# Patient Record
Sex: FEMALE | Race: WHITE | NOT HISPANIC OR LATINO | Employment: FULL TIME | URBAN - METROPOLITAN AREA
[De-identification: names, ages, dates, MRNs, and addresses within clinical notes are randomized per-mention and may not be internally consistent; named-entity substitution may affect disease eponyms.]

---

## 2023-01-12 DIAGNOSIS — M06.9 RHEUMATOID ARTHRITIS: Primary | ICD-10-CM

## 2023-05-15 ENCOUNTER — HOSPITAL ENCOUNTER (OUTPATIENT)
Dept: RADIOLOGY | Facility: HOSPITAL | Age: 58
Discharge: HOME | End: 2023-05-15
Attending: INTERNAL MEDICINE
Payer: COMMERCIAL

## 2023-05-15 ENCOUNTER — OFFICE VISIT (OUTPATIENT)
Dept: RHEUMATOLOGY | Facility: CLINIC | Age: 58
End: 2023-05-15
Payer: COMMERCIAL

## 2023-05-15 VITALS
OXYGEN SATURATION: 98 % | HEART RATE: 73 BPM | HEIGHT: 64 IN | BODY MASS INDEX: 23.05 KG/M2 | TEMPERATURE: 99 F | SYSTOLIC BLOOD PRESSURE: 124 MMHG | WEIGHT: 135 LBS | DIASTOLIC BLOOD PRESSURE: 70 MMHG

## 2023-05-15 DIAGNOSIS — M79.7 FIBROMYALGIA SYNDROME: ICD-10-CM

## 2023-05-15 DIAGNOSIS — L40.50 PSORIATIC ARTHRITIS: ICD-10-CM

## 2023-05-15 DIAGNOSIS — G47.00 INSOMNIA, UNSPECIFIED TYPE: ICD-10-CM

## 2023-05-15 DIAGNOSIS — L60.9 RIDGED NAILS: ICD-10-CM

## 2023-05-15 DIAGNOSIS — L40.50 PSORIATIC ARTHRITIS: Primary | ICD-10-CM

## 2023-05-15 PROBLEM — M06.9 RHEUMATOID ARTHRITIS: Status: ACTIVE | Noted: 2023-05-15

## 2023-05-15 PROCEDURE — 1159F PR MEDICATION LIST DOCUMENTED IN MEDICAL RECORD: ICD-10-PCS | Mod: CPTII,S$GLB,, | Performed by: INTERNAL MEDICINE

## 2023-05-15 PROCEDURE — 3008F BODY MASS INDEX DOCD: CPT | Mod: CPTII,S$GLB,, | Performed by: INTERNAL MEDICINE

## 2023-05-15 PROCEDURE — 3078F PR MOST RECENT DIASTOLIC BLOOD PRESSURE < 80 MM HG: ICD-10-PCS | Mod: CPTII,S$GLB,, | Performed by: INTERNAL MEDICINE

## 2023-05-15 PROCEDURE — 99999 PR PBB SHADOW E&M-EST. PATIENT-LVL IV: ICD-10-PCS | Mod: PBBFAC,,, | Performed by: INTERNAL MEDICINE

## 2023-05-15 PROCEDURE — 3008F PR BODY MASS INDEX (BMI) DOCUMENTED: ICD-10-PCS | Mod: CPTII,S$GLB,, | Performed by: INTERNAL MEDICINE

## 2023-05-15 PROCEDURE — 3078F DIAST BP <80 MM HG: CPT | Mod: CPTII,S$GLB,, | Performed by: INTERNAL MEDICINE

## 2023-05-15 PROCEDURE — 1159F MED LIST DOCD IN RCRD: CPT | Mod: CPTII,S$GLB,, | Performed by: INTERNAL MEDICINE

## 2023-05-15 PROCEDURE — 99204 PR OFFICE/OUTPT VISIT, NEW, LEVL IV, 45-59 MIN: ICD-10-PCS | Mod: S$GLB,,, | Performed by: INTERNAL MEDICINE

## 2023-05-15 PROCEDURE — 99999 PR PBB SHADOW E&M-EST. PATIENT-LVL IV: CPT | Mod: PBBFAC,,, | Performed by: INTERNAL MEDICINE

## 2023-05-15 PROCEDURE — 73130 X-RAY EXAM OF HAND: CPT | Mod: TC,50

## 2023-05-15 PROCEDURE — 99204 OFFICE O/P NEW MOD 45 MIN: CPT | Mod: S$GLB,,, | Performed by: INTERNAL MEDICINE

## 2023-05-15 PROCEDURE — 3074F SYST BP LT 130 MM HG: CPT | Mod: CPTII,S$GLB,, | Performed by: INTERNAL MEDICINE

## 2023-05-15 PROCEDURE — 3074F PR MOST RECENT SYSTOLIC BLOOD PRESSURE < 130 MM HG: ICD-10-PCS | Mod: CPTII,S$GLB,, | Performed by: INTERNAL MEDICINE

## 2023-05-15 RX ORDER — TIZANIDINE 2 MG/1
2 TABLET ORAL NIGHTLY
Qty: 90 TABLET | Refills: 3 | Status: SHIPPED | OUTPATIENT
Start: 2023-05-15 | End: 2023-05-26 | Stop reason: SDUPTHER

## 2023-05-15 RX ORDER — ATORVASTATIN CALCIUM 80 MG/1
80 TABLET, FILM COATED ORAL DAILY
COMMUNITY
End: 2023-08-30

## 2023-05-15 RX ORDER — FOLIC ACID 1 MG/1
1 TABLET ORAL DAILY
Qty: 30 TABLET | Refills: 5 | Status: SHIPPED | OUTPATIENT
Start: 2023-05-15 | End: 2023-08-30 | Stop reason: SDUPTHER

## 2023-05-15 RX ORDER — FLUOXETINE HYDROCHLORIDE 20 MG/1
20 CAPSULE ORAL DAILY
COMMUNITY

## 2023-05-15 RX ORDER — HYDROXYCHLOROQUINE SULFATE 200 MG/1
200 TABLET, FILM COATED ORAL 2 TIMES DAILY
COMMUNITY
End: 2023-05-15

## 2023-05-15 RX ORDER — METHOTREXATE 2.5 MG/1
10 TABLET ORAL
Qty: 60 TABLET | Refills: 3 | Status: SHIPPED | OUTPATIENT
Start: 2023-05-15 | End: 2023-05-26 | Stop reason: SDUPTHER

## 2023-05-15 NOTE — PROGRESS NOTES
"Subjective:       Patient ID: Lakshmi Zuñiga is a 58 y.o. female.    Chief Complaint: Referral (Referral for RA. Patient has been treated for RA before but is having a lot of flare ups lately. Patient complains of right thumb and right foot pain at this time. Pain 3/10)    Pt  is complaining of joint pain involving his MCP PIP wrist elbow shoulders hips knees and ankles bilaterally.  Is 7/10 in intensity dull in quality and continuous.  It is associated with a morning stiffness lasting for more than 60 minutes. Pt reports having difficulty maintaining a good night of sleep and this has been associated with myalgia of 7/10 in intensity.  This pain is dull continuous and gets worse mainly at night.  It is associated with fatigue.  No fever no chills no others.  The patient was diagnosed with rheumatoid arthritis by Dr. Stiles.  The patient has a history psoriasis, and now she has ridging of the nails and sausage fingers and toes.  This is diagnostic of psoriatic arthritis not rheumatoid.  I will switch her hydroxychloroquine to methotrexate and folic acid.      Review of Systems   Constitutional:  Negative for appetite change, chills and fever.   HENT:  Negative for congestion, ear pain, mouth sores, nosebleeds and trouble swallowing.    Eyes:  Negative for photophobia and discharge.   Respiratory:  Negative for chest tightness and shortness of breath.    Cardiovascular:  Negative for chest pain.   Gastrointestinal:  Negative for abdominal pain and vomiting.   Endocrine: Negative.    Genitourinary:  Negative for hematuria.   Musculoskeletal:         As per HPI   Skin:  Negative for rash.        idging of the nails   Neurological:  Negative for weakness.       Objective:   /70 (BP Location: Right arm, Patient Position: Sitting, BP Method: Medium (Automatic))   Pulse 73   Temp 98.6 °F (37 °C) (Oral)   Ht 5' 4" (1.626 m)   Wt 61.2 kg (135 lb)   SpO2 98%   BMI 23.17 kg/m²      Physical Exam   Constitutional: " She is oriented to person, place, and time. She appears well-developed and well-nourished. No distress.   HENT:   Head: Normocephalic and atraumatic.   Right Ear: External ear normal.   Left Ear: External ear normal.   Eyes: Pupils are equal, round, and reactive to light.   Cardiovascular: Normal rate, regular rhythm and normal heart sounds.   Pulmonary/Chest: Breath sounds normal.   Abdominal: Soft. There is no abdominal tenderness.   Musculoskeletal:      Right shoulder: Tenderness present.      Left shoulder: Tenderness present.      Right elbow: Tenderness present.      Left elbow: Tenderness present.      Right wrist: Tenderness present.      Left wrist: Tenderness present.      Cervical back: Neck supple.      Right hip: Tenderness present.      Left hip: Tenderness present.      Right knee: Tenderness present.      Left knee: Tenderness present.      Right ankle: Tenderness present.      Left ankle: Tenderness present.   Lymphadenopathy:     She has no cervical adenopathy.   Neurological: She is alert and oriented to person, place, and time. She displays normal reflexes. No cranial nerve deficit or sensory deficit. She exhibits normal muscle tone. Coordination normal.   Skin: No rash noted. No erythema.   idging of the nails   Vitals reviewed.      Right Side Rheumatological Exam     The patient is tender to palpation of the shoulder, elbow, wrist, knee, 1st PIP, 1st MCP, 2nd PIP, 2nd MCP, 3rd PIP, 3rd MCP, 4th PIP, 4th MCP, 5th PIP, hip, ankle, 1st MTP, 2nd MTP, 3rd MTP, 4th MTP, 5th MTP, 1st toe IP, 2nd toe IP, 3rd toe IP, 4th toe IP and 5th toe IP    Left Side Rheumatological Exam     The patient is tender to palpation of the shoulder, elbow, wrist, knee, 1st PIP, 1st MCP, 2nd PIP, 2nd MCP, 3rd PIP, 3rd MCP, 4th PIP, 4th MCP, 5th PIP, 5th MCP, hip, ankle, 1st MTP, 2nd MTP, 3rd MTP, 4th MTP, 5th MTP, 1st toe IP, 2nd toe IP, 3rd toe IP, 4th toe IP and 5th toe IP.       Completed Fibromyalgia exam 18/18  tender points.  No data to display     Assessment:       1. Psoriatic arthritis    2. Ridged nails    3. Fibromyalgia syndrome    4. Insomnia, unspecified type          Medication List with Changes/Refills   New Medications    FOLIC ACID (FOLVITE) 1 MG TABLET    Take 1 tablet (1 mg total) by mouth once daily. After food       Start Date: 5/15/2023 End Date: 11/11/2023    METHOTREXATE 2.5 MG TAB    Take 4 tablets (10 mg total) by mouth every 7 days. EVERY        TAKE 4 TAB TOGETHER AFTER SUPPER       Start Date: 5/15/2023 End Date: 7/8/2024    TIZANIDINE (ZANAFLEX) 2 MG TABLET    Take 1 tablet (2 mg total) by mouth nightly.       Start Date: 5/15/2023 End Date: 5/9/2024   Current Medications    ATORVASTATIN (LIPITOR) 80 MG TABLET    Take 80 mg by mouth once daily.       Start Date: --        End Date: --    BISACODYL 5 MG TAB    Take 20 mg by mouth once daily.       Start Date: --        End Date: --    FLUOXETINE 20 MG CAPSULE    Take 20 mg by mouth once daily.       Start Date: --        End Date: --   Discontinued Medications    HYDROXYCHLOROQUINE (PLAQUENIL) 200 MG TABLET    Take 200 mg by mouth 2 (two) times daily.       Start Date: --        End Date: 5/15/2023         Plan:         Problem List Items Addressed This Visit          Derm    Ridged nails    Relevant Medications    methotrexate 2.5 MG Tab    tiZANidine (ZANAFLEX) 2 MG tablet    folic acid (FOLVITE) 1 MG tablet    Other Relevant Orders    CBC Auto Differential    Comprehensive Metabolic Panel    CRP, High Sensitivity    Vitamin D    Rheumatoid Quantitative    Cyclic Citrullinated Peptide Antibody, IgG    Antinuclear Ab, HEp-2 Substrate    Hepatitis B Surface Antigen    Hepatitis C Antibody    TSH    T4, Free    X-Ray Hand Complete Bilateral       Orthopedic    Psoriatic arthritis - Primary    Relevant Medications    methotrexate 2.5 MG Tab    tiZANidine (ZANAFLEX) 2 MG tablet    folic acid (FOLVITE) 1 MG tablet    Other Relevant Orders    CBC Auto  Differential    Comprehensive Metabolic Panel    CRP, High Sensitivity    Vitamin D    Rheumatoid Quantitative    Cyclic Citrullinated Peptide Antibody, IgG    Antinuclear Ab, HEp-2 Substrate    Hepatitis B Surface Antigen    Hepatitis C Antibody    TSH    T4, Free    X-Ray Hand Complete Bilateral    Fibromyalgia syndrome    Relevant Medications    methotrexate 2.5 MG Tab    tiZANidine (ZANAFLEX) 2 MG tablet    folic acid (FOLVITE) 1 MG tablet    Other Relevant Orders    CBC Auto Differential    Comprehensive Metabolic Panel    CRP, High Sensitivity    Vitamin D    Rheumatoid Quantitative    Cyclic Citrullinated Peptide Antibody, IgG    Antinuclear Ab, HEp-2 Substrate    Hepatitis B Surface Antigen    Hepatitis C Antibody    TSH    T4, Free    X-Ray Hand Complete Bilateral       Other    Insomnia    Relevant Medications    methotrexate 2.5 MG Tab    tiZANidine (ZANAFLEX) 2 MG tablet    folic acid (FOLVITE) 1 MG tablet    Other Relevant Orders    CBC Auto Differential    Comprehensive Metabolic Panel    CRP, High Sensitivity    Vitamin D    Rheumatoid Quantitative    Cyclic Citrullinated Peptide Antibody, IgG    Antinuclear Ab, HEp-2 Substrate    Hepatitis B Surface Antigen    Hepatitis C Antibody    TSH    T4, Free    X-Ray Hand Complete Bilateral

## 2023-05-26 DIAGNOSIS — L40.50 PSORIATIC ARTHRITIS: ICD-10-CM

## 2023-05-26 DIAGNOSIS — M79.7 FIBROMYALGIA SYNDROME: ICD-10-CM

## 2023-05-26 DIAGNOSIS — L60.9 RIDGED NAILS: ICD-10-CM

## 2023-05-26 DIAGNOSIS — G47.00 INSOMNIA, UNSPECIFIED TYPE: ICD-10-CM

## 2023-05-26 NOTE — TELEPHONE ENCOUNTER
Patient needed a 90 day supply on her medications but only got a 30 day supply. She would like 60 day supply sent to [facility] in [facility]

## 2023-05-31 RX ORDER — TIZANIDINE 2 MG/1
2 TABLET ORAL NIGHTLY
Qty: 60 TABLET | Refills: 0 | Status: SHIPPED | OUTPATIENT
Start: 2023-05-31 | End: 2023-07-18 | Stop reason: SDUPTHER

## 2023-05-31 RX ORDER — METHOTREXATE 2.5 MG/1
10 TABLET ORAL
Qty: 60 TABLET | Refills: 0 | Status: SHIPPED | OUTPATIENT
Start: 2023-05-31 | End: 2023-08-30 | Stop reason: SDUPTHER

## 2023-07-18 ENCOUNTER — TELEPHONE (OUTPATIENT)
Dept: MATERNAL FETAL MEDICINE | Facility: CLINIC | Age: 58
End: 2023-07-18
Payer: OTHER GOVERNMENT

## 2023-07-18 DIAGNOSIS — L40.50 PSORIATIC ARTHRITIS: ICD-10-CM

## 2023-07-18 DIAGNOSIS — L60.9 RIDGED NAILS: ICD-10-CM

## 2023-07-18 DIAGNOSIS — G47.00 INSOMNIA, UNSPECIFIED TYPE: ICD-10-CM

## 2023-07-18 DIAGNOSIS — M79.7 FIBROMYALGIA SYNDROME: ICD-10-CM

## 2023-07-18 RX ORDER — TIZANIDINE 2 MG/1
2 TABLET ORAL NIGHTLY
Qty: 30 TABLET | Refills: 3 | Status: SHIPPED | OUTPATIENT
Start: 2023-07-18 | End: 2023-08-30 | Stop reason: SDUPTHER

## 2023-07-18 RX ORDER — PREDNISONE 5 MG/1
5 TABLET ORAL DAILY PRN
Qty: 30 TABLET | Refills: 5 | Status: SHIPPED | OUTPATIENT
Start: 2023-07-18 | End: 2023-08-30

## 2023-07-18 NOTE — TELEPHONE ENCOUNTER
----- Message from Rody Henley sent at 7/18/2023  9:25 AM CDT -----  Regarding: Medication  Patient called stating she has been on Folic Acid and Methotrexate for about (1) month and her symptoms are not getting any better. Please Advise

## 2023-07-18 NOTE — TELEPHONE ENCOUNTER
We need to give the methotrexate more time to start working.It can take 3-6 weeks for you to feel the methotrexate to start working and even up to 12 weeks to get the full effect of the medication. I recommend she give it some more time.  If she is in a lot of pain we can prescribe a low dose prednisone 5 mg as needed for bridge therapy during the time the methotrexate is starting to work. If amicable I can send to her pharmacy.

## 2023-08-30 ENCOUNTER — OFFICE VISIT (OUTPATIENT)
Dept: RHEUMATOLOGY | Facility: CLINIC | Age: 58
End: 2023-08-30
Payer: OTHER GOVERNMENT

## 2023-08-30 VITALS
DIASTOLIC BLOOD PRESSURE: 80 MMHG | HEIGHT: 64 IN | HEART RATE: 76 BPM | SYSTOLIC BLOOD PRESSURE: 118 MMHG | BODY MASS INDEX: 23.32 KG/M2 | OXYGEN SATURATION: 98 % | WEIGHT: 136.63 LBS | RESPIRATION RATE: 18 BRPM | TEMPERATURE: 98 F

## 2023-08-30 DIAGNOSIS — G47.00 INSOMNIA, UNSPECIFIED TYPE: ICD-10-CM

## 2023-08-30 DIAGNOSIS — M79.7 FIBROMYALGIA SYNDROME: ICD-10-CM

## 2023-08-30 DIAGNOSIS — L60.9 RIDGED NAILS: ICD-10-CM

## 2023-08-30 DIAGNOSIS — L40.50 PSORIATIC ARTHRITIS: Primary | ICD-10-CM

## 2023-08-30 PROCEDURE — 99999 PR PBB SHADOW E&M-EST. PATIENT-LVL III: ICD-10-PCS | Mod: PBBFAC,,,

## 2023-08-30 PROCEDURE — 99999 PR PBB SHADOW E&M-EST. PATIENT-LVL III: CPT | Mod: PBBFAC,,,

## 2023-08-30 PROCEDURE — 99213 OFFICE O/P EST LOW 20 MIN: CPT | Mod: S$PBB,,,

## 2023-08-30 PROCEDURE — 99213 PR OFFICE/OUTPT VISIT, EST, LEVL III, 20-29 MIN: ICD-10-PCS | Mod: S$PBB,,,

## 2023-08-30 PROCEDURE — 99213 OFFICE O/P EST LOW 20 MIN: CPT | Mod: PBBFAC

## 2023-08-30 RX ORDER — ROSUVASTATIN CALCIUM 40 MG/1
10 TABLET, COATED ORAL NIGHTLY
COMMUNITY

## 2023-08-30 RX ORDER — FOLIC ACID 1 MG/1
1 TABLET ORAL DAILY
Qty: 90 TABLET | Refills: 3 | Status: SHIPPED | OUTPATIENT
Start: 2023-08-30

## 2023-08-30 RX ORDER — TIZANIDINE 2 MG/1
2 TABLET ORAL NIGHTLY
Qty: 90 TABLET | Refills: 3 | Status: SHIPPED | OUTPATIENT
Start: 2023-08-30

## 2023-08-30 RX ORDER — IBUPROFEN 600 MG/1
600 TABLET ORAL 2 TIMES DAILY PRN
COMMUNITY

## 2023-08-30 RX ORDER — LORATADINE 10 MG/1
10 TABLET ORAL DAILY
COMMUNITY

## 2023-08-30 RX ORDER — METHOTREXATE 2.5 MG/1
10 TABLET ORAL
Qty: 90 TABLET | Refills: 3 | Status: SHIPPED | OUTPATIENT
Start: 2023-08-30 | End: 2024-01-23 | Stop reason: SDUPTHER

## 2023-08-30 RX ORDER — TRIAMCINOLONE ACETONIDE 5 MG/G
CREAM TOPICAL
COMMUNITY
End: 2023-08-30

## 2023-08-30 NOTE — ASSESSMENT & PLAN NOTE
Avoid caffeine, alcohol and stimulants.  Power down electronic devices at least one hour prior to bedtime.  Keep room dark; use eye mask or relaxation sound machine to promote rest.  Sleep hygiene refers to actions that tend to improve and maintain good sleep:  Sleep as long as necessary to feel rested (usually seven to eight hours for adults) and then get out of bed  Maintain a regular sleep schedule, particularly a regular wake-up time in the morning  Avoid smoking or other nicotine intake, particularly during the evening

## 2023-08-30 NOTE — PROGRESS NOTES
Subjective:           Patient ID: Lakshmi Zuñiga is a 58 y.o. female.    Chief Complaint: Follow-up (Feeling bloated , follow up )      HPI Ms. is a 59y/o female here for a f./u. She was a previous patient of Dr. Stiles being treated for RA with only previous treatment of HCQ.  She established care with Dr. Caldera on 5/15/2023. She has history of psoriasis and has ridging of the nails and sausage fingers and toes. This is diagnostic for psoriatic arthritis. Past labs: VIKTOR neg, RF Neg, CCP Neg. At her last office visit May 2023 HCQ was discontinued and she was started on MTX 10 mg weekly. She reports the medicatoin is working well. She is having bloating which may or may not be related to the MTX of note she did have bloating before starting the methotrexate too. 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    Today she is reporting joint pain involving the MCP PIP wrist elbow shoulders hips knees and ankles bilaterally.  The pain is 4/10 in intensity dull in quality and continuous.  That is associated with a morning stiffness lasting for more than 60 minutes.  Is also having difficulty maintaining a good night of sleep.  This has been associated with myalgias.  Muscle aches are 2/10 in intensity dull in quality and continuous.  They are associated with fatigue.  Denies fever or chills.      Review of Systems   Constitutional:  Negative for appetite change, chills and fever.   HENT:  Negative for congestion, ear pain, mouth sores, nosebleeds and trouble swallowing.    Eyes:  Negative for photophobia and discharge.   Respiratory:  Negative for chest tightness and shortness of breath.    Cardiovascular:  Negative for chest pain.   Gastrointestinal:  Negative for abdominal pain and vomiting.   Endocrine: Negative.    Genitourinary:  Negative for hematuria.   Musculoskeletal:         As per HPI   Skin:  Negative for rash.   Neurological:  Negative for  "weakness.         Objective:   /80 (BP Location: Right arm, Patient Position: Sitting, BP Method: Medium (Automatic))   Pulse 76   Temp 98.2 °F (36.8 °C) (Oral)   Resp 18   Ht 5' 4" (1.626 m)   Wt 62 kg (136 lb 9.6 oz)   SpO2 98%   BMI 23.45 kg/m²          Physical Exam   Constitutional: She is oriented to person, place, and time. She appears well-developed and well-nourished. No distress.   HENT:   Head: Normocephalic and atraumatic.   Right Ear: External ear normal.   Left Ear: External ear normal.   Eyes: Pupils are equal, round, and reactive to light.   Cardiovascular: Normal rate.   Pulmonary/Chest: Effort normal.   Abdominal: Soft. There is no abdominal tenderness.   Musculoskeletal:      Right elbow: Normal.      Left elbow: Normal.      Right wrist: Normal.      Left wrist: Normal.      Cervical back: Neck supple.      Right hip: Normal.      Left hip: Normal.      Right knee: Normal.      Left knee: Normal.   Lymphadenopathy:     She has no cervical adenopathy.   Neurological: She is alert and oriented to person, place, and time. She displays normal reflexes. No cranial nerve deficit or sensory deficit. She exhibits normal muscle tone. Coordination normal.   Skin: No rash noted. No erythema.   Vitals reviewed.      Right Side Rheumatological Exam     Examination finds the elbow, wrist, knee, 1st PIP, 1st MCP, 2nd PIP, 2nd MCP, 3rd PIP, 3rd MCP, 4th PIP, 4th MCP, 5th PIP, 5th MCP, hip, ankle, 1st MTP, 2nd MTP, 3rd MTP, 4th MTP and 5th MTP normal.    Left Side Rheumatological Exam     Examination finds the elbow, wrist, knee, 1st PIP, 1st MCP, 2nd PIP, 2nd MCP, 3rd PIP, 3rd MCP, 4th PIP, 4th MCP, 5th PIP, 5th MCP, hip, ankle, 1st MTP, 2nd MTP, 3rd MTP, 4th MTP and 5th MTP normal.           Completed Fibromyalgia exam 12/18 tender points.    No data to display     Assessment:         Medication List with Changes/Refills   Current Medications    BISACODYL 5 MG TAB    Take 20 mg by mouth once daily. "       Start Date: --        End Date: --    FLUOXETINE 20 MG CAPSULE    Take 20 mg by mouth once daily.       Start Date: --        End Date: --    IBUPROFEN (ADVIL,MOTRIN) 600 MG TABLET    Take 600 mg by mouth 2 (two) times daily as needed for Pain.       Start Date: --        End Date: --    LORATADINE (CLARITIN) 10 MG TABLET    Take 10 mg by mouth once daily.       Start Date: --        End Date: --    ROSUVASTATIN (CRESTOR) 40 MG TAB    Take 10 mg by mouth every evening. Taking 20 mg Bid       Start Date: --        End Date: --   Changed and/or Refilled Medications    Modified Medication Previous Medication    FOLIC ACID (FOLVITE) 1 MG TABLET folic acid (FOLVITE) 1 MG tablet       Take 1 tablet (1 mg total) by mouth once daily. After food    Take 1 tablet (1 mg total) by mouth once daily. After food       Start Date: 8/30/2023 End Date: --    Start Date: 5/15/2023 End Date: 8/30/2023    METHOTREXATE 2.5 MG TAB methotrexate 2.5 MG Tab       Take 4 tablets (10 mg total) by mouth every 7 days. EVERY SUNDAY TAKE 4 TABS TOGETHER AFTER SUPPER    Take 4 tablets (10 mg total) by mouth every 7 days. EVERY        TAKE 4 TAB TOGETHER AFTER SUPPER       Start Date: 8/30/2023 End Date: --    Start Date: 5/31/2023 End Date: 8/30/2023    TIZANIDINE (ZANAFLEX) 2 MG TABLET tiZANidine (ZANAFLEX) 2 MG tablet       Take 1 tablet (2 mg total) by mouth nightly.    Take 1 tablet (2 mg total) by mouth nightly.       Start Date: 8/30/2023 End Date: --    Start Date: 7/18/2023 End Date: 8/30/2023   Discontinued Medications    ATORVASTATIN (LIPITOR) 80 MG TABLET    Take 80 mg by mouth once daily.       Start Date: --        End Date: 8/30/2023    PREDNISONE (DELTASONE) 5 MG TABLET    Take 1 tablet (5 mg total) by mouth daily as needed (for flare up).       Start Date: 7/18/2023 End Date: 8/30/2023    TRIAMCINOLONE ACETONIDE 0.5% (KENALOG) 0.5 % CREA    Apply topically as needed.       Start Date: --        End Date: 8/30/2023          ICD-10-CM ICD-9-CM   1. Psoriatic arthritis  L40.50 696.0   2. Fibromyalgia syndrome  M79.7 729.1   3. Insomnia, unspecified type  G47.00 780.52   4. Ridged nails  L60.9 703.8           Plan:       1. Psoriatic arthritis  Overview:  Previous patient of Dr. Stiles- she was being treated for RA and has only used HCQ in the past.    Assessment & Plan:  Continue methotrexate 10 mg Q 7 days  Continue folic acid daily      Labs with next office visit    Orders:  -     methotrexate 2.5 MG Tab; Take 4 tablets (10 mg total) by mouth every 7 days. EVERY SUNDAY TAKE 4 TABS TOGETHER AFTER SUPPER  Dispense: 90 tablet; Refill: 3  -     folic acid (FOLVITE) 1 MG tablet; Take 1 tablet (1 mg total) by mouth once daily. After food  Dispense: 90 tablet; Refill: 3    2. Fibromyalgia syndrome  Assessment & Plan:  Continue tizanidine 2 mg nightly    Orders:  -     tiZANidine (ZANAFLEX) 2 MG tablet; Take 1 tablet (2 mg total) by mouth nightly.  Dispense: 90 tablet; Refill: 3    3. Insomnia, unspecified type  Assessment & Plan:  Avoid caffeine, alcohol and stimulants.  Power down electronic devices at least one hour prior to bedtime.  Keep room dark; use eye mask or relaxation sound machine to promote rest.  Sleep hygiene refers to actions that tend to improve and maintain good sleep:  Sleep as long as necessary to feel rested (usually seven to eight hours for adults) and then get out of bed  Maintain a regular sleep schedule, particularly a regular wake-up time in the morning  Avoid smoking or other nicotine intake, particularly during the evening          4. Ridged nails

## 2024-01-22 PROBLEM — Z79.899 DRUG-INDUCED IMMUNODEFICIENCY: Status: ACTIVE | Noted: 2024-01-22

## 2024-01-22 PROBLEM — D84.821 DRUG-INDUCED IMMUNODEFICIENCY: Status: ACTIVE | Noted: 2024-01-22

## 2024-01-22 NOTE — PROGRESS NOTES
Subjective:           Patient ID: Lakshmi Zuñiga is a 58 y.o. female.    Chief Complaint: Follow-up (Patient has complaints with the methotrexate . She is Aching , flare ups-wrist and feet , no energy and a cough that she can't get rid of . )      Ms. Zuñiga is a 57y/o female here for a f./u. She was in the [employer] int he past. She was a previous patient of Dr. Stiles being treated for RA with only previous treatment of HCQ.  She established care with Dr. Caldera on 5/15/2023. She has history of psoriasis and has ridging of the nails and sausage fingers and toes. Dr. Caldera diagnosed her with psoriatic arthritis. Past labs: VIKTOR neg, RF Neg, CCP Neg. In May 2023 HCQ was discontinued and she was started on MTX 10 mg weekly. At her last office visit in August 2023 she was having bloating with low suspicion related to MTX as she had bloating before starting the MTX. 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    Followed by Cardiology: Gracie  of 2 stents 2015  Denies history of fevers, rashes, photosensitivity, oral or nasal ulcers, h/o MI, stroke, seizures, h/o PE or DVT, Raynaud's phenomenon, uveitis, malignancies.      Family history of autoimmune disease: Mother has RA  Pregnancies: 3  Miscarriages: 0  Smoking: Past smoker. (Quit smoking 12 years ago)    PCP at [facility] in [place] but unsure of her name.    Today January 23, 2024: Continues on MTX 10 mg weekly. Last month symptoms have started getting worse. She is now having Stiffness all day long. Last flare up about 1 month ago, left wrist red/warm/swollen joints. (At last visit she was well controlled on MTX 10 mg).  Denies recent infections since last office visit but does endorse a sick aching feeling.           Review of Systems   Constitutional:  Negative for appetite change, chills and fever.   HENT:  Negative for congestion, ear pain, mouth sores, nosebleeds and trouble swallowing.    Eyes:  Negative for  "photophobia and discharge.   Respiratory:  Negative for chest tightness and shortness of breath.    Cardiovascular:  Negative for chest pain.   Gastrointestinal:  Negative for abdominal pain and vomiting.   Endocrine: Negative.    Genitourinary:  Negative for hematuria.   Musculoskeletal:  Positive for arthralgias and joint swelling.        As per HPI   Skin:  Negative for rash.   Neurological:  Negative for weakness.         Objective:   /72 (BP Location: Left arm, Patient Position: Sitting, BP Method: Medium (Automatic))   Pulse 66   Temp 97.1 °F (36.2 °C) (Temporal)   Resp 18   Ht 5' 4" (1.626 m)   Wt 62.9 kg (138 lb 9.6 oz)   SpO2 98%   BMI 23.79 kg/m²          Physical Exam   Constitutional: She is oriented to person, place, and time. She appears well-developed and well-nourished. No distress.   HENT:   Head: Normocephalic and atraumatic.   Right Ear: External ear normal.   Left Ear: External ear normal.   Eyes: Pupils are equal, round, and reactive to light.   Cardiovascular: Normal rate.   Pulmonary/Chest: Effort normal.   Abdominal: Soft. There is no abdominal tenderness.   Musculoskeletal:      Right elbow: Normal.      Left elbow: Normal.      Right wrist: Normal.      Left wrist: Normal.      Cervical back: Neck supple.      Right hip: Normal.      Left hip: Normal.      Right knee: Normal.      Left knee: Normal.      Right ankle: Tenderness present.      Left ankle: Tenderness present.      Comments: Sausage fingers   Lymphadenopathy:     She has no cervical adenopathy.   Neurological: She is alert and oriented to person, place, and time. She displays normal reflexes. No cranial nerve deficit or sensory deficit. She exhibits normal muscle tone. Coordination normal.   Skin: No rash noted. No erythema.   Vitals reviewed.      Right Side Rheumatological Exam     Examination finds the elbow, wrist, knee, 1st PIP, 1st MCP, 2nd PIP, 5th MCP, hip, 1st MTP, 2nd MTP, 3rd MTP, 4th MTP and 5th MTP " normal.    The patient is tender to palpation of the 2nd MCP, 3rd PIP, 3rd MCP, 4th PIP, 4th MCP, 5th PIP and ankle    Left Side Rheumatological Exam     Examination finds the elbow, wrist, knee, 1st MCP, 2nd MCP, 3rd PIP, 4th MCP, hip, 1st MTP, 2nd MTP, 3rd MTP, 4th MTP and 5th MTP normal.    The patient is tender to palpation of the 1st PIP, 2nd PIP, 3rd MCP, 4th PIP, 5th PIP, 5th MCP and ankle.       Completed Fibromyalgia exam 12/18 tender points.    No data to display     Assessment:         Medication List with Changes/Refills   New Medications    PREDNISONE (DELTASONE) 5 MG TABLET    Take 1 tablet (5 mg total) by mouth daily as needed (for flare up).       Start Date: 1/23/2024 End Date: --   Current Medications    BISACODYL 5 MG TAB    Take 20 mg by mouth once daily.       Start Date: --        End Date: --    FLUOXETINE 20 MG CAPSULE    Take 20 mg by mouth once daily.       Start Date: --        End Date: --    FOLIC ACID (FOLVITE) 1 MG TABLET    Take 1 tablet (1 mg total) by mouth once daily. After food       Start Date: 8/30/2023 End Date: --    IBUPROFEN (ADVIL,MOTRIN) 600 MG TABLET    Take 600 mg by mouth 2 (two) times daily as needed for Pain.       Start Date: --        End Date: --    LORATADINE (CLARITIN) 10 MG TABLET    Take 10 mg by mouth once daily.       Start Date: --        End Date: --    ROSUVASTATIN (CRESTOR) 40 MG TAB    Take 10 mg by mouth every evening. Taking 20 mg Bid       Start Date: --        End Date: --    TIZANIDINE (ZANAFLEX) 2 MG TABLET    Take 1 tablet (2 mg total) by mouth nightly.       Start Date: 8/30/2023 End Date: --   Changed and/or Refilled Medications    Modified Medication Previous Medication    METHOTREXATE 2.5 MG TAB methotrexate 2.5 MG Tab       Take 6 tablets (15 mg total) by mouth every 7 days. EVERY SUNDAY TAKE 3 TABS AFTER BREAKFAST AND 3 TABS AFTER SUPPER    Take 4 tablets (10 mg total) by mouth every 7 days. EVERY SUNDAY TAKE 4 TABS TOGETHER AFTER SUPPER        Start Date: 1/23/2024 End Date: --    Start Date: 8/30/2023 End Date: 1/23/2024         ICD-10-CM ICD-9-CM   1. Psoriatic arthritis  L40.50 696.0   2. Drug-induced immunodeficiency  D84.821 279.3    Z79.899 E947.9   3. Fibromyalgia syndrome  M79.7 729.1   4. Insomnia, unspecified type  G47.00 780.52             Plan:       1. Psoriatic arthritis  Overview:  Previous patient of Dr. Stiles- she was being treated for RA and has only used HCQ in the past. During her initial visit with Dr. Caldera he noted that the patient has a history psoriasis, and now she has ridging of the nails and sausage fingers and toes and diagnosed her with psoriatic arthritis    Assessment & Plan:  Increased morning stiffness and joint pain. Synovitis on exam today    Increase MTX to 15 mg Q 7 days  Start prednisone 5 mg daily PRN for flare up. Discussed not to use daily, only as needed. Discussed taking this low dose for the next 2-3 days then stop.   Continue Folic Acid 1 mg daily    Labs ordered today for continued monitoring.  Repeat labs in 4-6 weeks and evaluate the need to increase dose of MTX to 20 mg.    Orders:  -     methotrexate 2.5 MG Tab; Take 6 tablets (15 mg total) by mouth every 7 days. EVERY SUNDAY TAKE 3 TABS AFTER BREAKFAST AND 3 TABS AFTER SUPPER  Dispense: 90 tablet; Refill: 1  -     Sedimentation rate; Future; Expected date: 01/23/2024  -     C-Reactive Protein; Future; Expected date: 01/23/2024  -     Comprehensive Metabolic Panel; Future; Expected date: 01/23/2024  -     CBC Auto Differential; Future; Expected date: 01/23/2024  -     Comprehensive Metabolic Panel; Future; Expected date: 02/13/2024  -     CBC Auto Differential; Future; Expected date: 02/13/2024  -     predniSONE (DELTASONE) 5 MG tablet; Take 1 tablet (5 mg total) by mouth daily as needed (for flare up).  Dispense: 30 tablet; Refill: 0    2. Drug-induced immunodeficiency  Assessment & Plan:  Compromised immune system s/t autoimmune disease and use of  immunosuppressive medications. (MTX)  Hep serologies negative  - Monitor carefully for infections and toxicities.   - Advised strict adherence to age appropriate vaccinations and cancer screenings- including yearly skin exams  - Advised to get immediate medical care if any infection.   - hold MTX with infections        3. Fibromyalgia syndrome  Assessment & Plan:  Stable   Previously prescribed Tizanidine for symptoms related to Fibro. She is aware that she is no longer able to get future refills of these medications as Dr. Caldera is no longer here. Discussed options such as pain management, PCP or following Dr. Caldera to continue her current medication treatment of Fibro. She currently has 7 more months of refills on her current prescription.    Fibromyalgia is a chronic pain syndrome.  Underlying causes of fibromyalgia may be numerous.  An underlying nonrestorative sleep pattern, mental and physical stressors play a role in pain perception.  Discussed importance of decreasing stress levels and improving sleep patterns/hygiene.   Exercise and a healthy life-style is important in management of this syndrome.         4. Insomnia, unspecified type  Assessment & Plan:  Avoid caffeine, alcohol and stimulants.  Power down electronic devices at least one hour prior to bedtime.  Keep room dark; use eye mask or relaxation sound machine to promote rest.  Sleep hygiene refers to actions that tend to improve and maintain good sleep:  Sleep as long as necessary to feel rested (usually seven to eight hours for adults) and then get out of bed  Maintain a regular sleep schedule, particularly a regular wake-up time in the morning  Avoid smoking or other nicotine intake, particularly during the evening

## 2024-01-22 NOTE — ASSESSMENT & PLAN NOTE
Compromised immune system s/t autoimmune disease and use of immunosuppressive medications. (MTX)  Hep serologies negative  - Monitor carefully for infections and toxicities.   - Advised strict adherence to age appropriate vaccinations and cancer screenings- including yearly skin exams  - Advised to get immediate medical care if any infection.   - hold MTX with infections

## 2024-01-23 ENCOUNTER — OFFICE VISIT (OUTPATIENT)
Dept: RHEUMATOLOGY | Facility: CLINIC | Age: 59
End: 2024-01-23
Payer: COMMERCIAL

## 2024-01-23 VITALS
TEMPERATURE: 97 F | SYSTOLIC BLOOD PRESSURE: 105 MMHG | DIASTOLIC BLOOD PRESSURE: 72 MMHG | HEART RATE: 66 BPM | OXYGEN SATURATION: 98 % | HEIGHT: 64 IN | RESPIRATION RATE: 18 BRPM | BODY MASS INDEX: 23.67 KG/M2 | WEIGHT: 138.63 LBS

## 2024-01-23 DIAGNOSIS — Z79.899 DRUG-INDUCED IMMUNODEFICIENCY: ICD-10-CM

## 2024-01-23 DIAGNOSIS — M79.7 FIBROMYALGIA SYNDROME: ICD-10-CM

## 2024-01-23 DIAGNOSIS — L40.50 PSORIATIC ARTHRITIS: Primary | ICD-10-CM

## 2024-01-23 DIAGNOSIS — G47.00 INSOMNIA, UNSPECIFIED TYPE: ICD-10-CM

## 2024-01-23 DIAGNOSIS — D84.821 DRUG-INDUCED IMMUNODEFICIENCY: ICD-10-CM

## 2024-01-23 PROCEDURE — 1159F MED LIST DOCD IN RCRD: CPT | Mod: CPTII,S$GLB,,

## 2024-01-23 PROCEDURE — 3008F BODY MASS INDEX DOCD: CPT | Mod: CPTII,S$GLB,,

## 2024-01-23 PROCEDURE — 99214 OFFICE O/P EST MOD 30 MIN: CPT | Mod: S$GLB,,,

## 2024-01-23 PROCEDURE — 3074F SYST BP LT 130 MM HG: CPT | Mod: CPTII,S$GLB,,

## 2024-01-23 PROCEDURE — 99999 PR PBB SHADOW E&M-EST. PATIENT-LVL IV: CPT | Mod: PBBFAC,,,

## 2024-01-23 PROCEDURE — 3078F DIAST BP <80 MM HG: CPT | Mod: CPTII,S$GLB,,

## 2024-01-23 RX ORDER — METHOTREXATE 2.5 MG/1
15 TABLET ORAL
Qty: 90 TABLET | Refills: 1 | Status: SHIPPED | OUTPATIENT
Start: 2024-01-23 | End: 2024-04-29

## 2024-01-23 RX ORDER — PREDNISONE 5 MG/1
5 TABLET ORAL DAILY PRN
Qty: 30 TABLET | Refills: 0 | Status: SHIPPED | OUTPATIENT
Start: 2024-01-23

## 2024-01-23 NOTE — ASSESSMENT & PLAN NOTE
Stable   Previously prescribed Tizanidine for symptoms related to Fibro. She is aware that she is no longer able to get future refills of these medications as Dr. Caldera is no longer here. Discussed options such as pain management, PCP or following Dr. Caldera to continue her current medication treatment of Fibro. She currently has 7 more months of refills on her current prescription.    Fibromyalgia is a chronic pain syndrome.  Underlying causes of fibromyalgia may be numerous.  An underlying nonrestorative sleep pattern, mental and physical stressors play a role in pain perception.  Discussed importance of decreasing stress levels and improving sleep patterns/hygiene.   Exercise and a healthy life-style is important in management of this syndrome.

## 2024-01-24 ENCOUNTER — TELEPHONE (OUTPATIENT)
Dept: RHEUMATOLOGY | Facility: CLINIC | Age: 59
End: 2024-01-24
Payer: COMMERCIAL

## 2024-01-24 NOTE — TELEPHONE ENCOUNTER
----- Message from RC Christine sent at 1/24/2024  9:12 AM CST -----  Labs reviewed. Inflammation level is elevated, kidney function, and liver enzymes are all normal. All other labs are within acceptable range. She can start the increased dose of the methotrexate and we will recheck labs in 4-6 weeks.  DARNELL Cortes NP

## 2024-01-25 NOTE — ASSESSMENT & PLAN NOTE
Increased morning stiffness and joint pain. Synovitis on exam today    Increase MTX to 15 mg Q 7 days  Start prednisone 5 mg daily PRN for flare up. Discussed not to use daily, only as needed. Discussed taking this low dose for the next 2-3 days then stop.   Continue Folic Acid 1 mg daily    Labs ordered today for continued monitoring.  Repeat labs in 4-6 weeks and evaluate the need to increase dose of MTX to 20 mg.

## 2024-02-26 DIAGNOSIS — L40.50 PSORIATIC ARTHRITIS: Primary | ICD-10-CM

## 2024-02-26 NOTE — PROGRESS NOTES
Methotrexate was increased 1 month ago. Please remind the patient to complete labs in the next 2 weeks.  The orders are already placed  Thanks

## 2024-02-28 ENCOUNTER — TELEPHONE (OUTPATIENT)
Dept: RHEUMATOLOGY | Facility: CLINIC | Age: 59
End: 2024-02-28
Payer: COMMERCIAL

## 2024-02-28 ENCOUNTER — LAB VISIT (OUTPATIENT)
Dept: LAB | Facility: HOSPITAL | Age: 59
End: 2024-02-28
Payer: OTHER GOVERNMENT

## 2024-02-28 DIAGNOSIS — L40.50 PSORIATIC ARTHRITIS: ICD-10-CM

## 2024-02-28 DIAGNOSIS — L40.50 PSORIATIC ARTHRITIS: Primary | ICD-10-CM

## 2024-02-28 LAB
ALBUMIN SERPL-MCNC: 4.2 G/DL (ref 3.5–5)
ALBUMIN/GLOB SERPL: 1.5 RATIO (ref 1.1–2)
ALP SERPL-CCNC: 59 UNIT/L (ref 40–150)
ALT SERPL-CCNC: 63 UNIT/L (ref 0–55)
AST SERPL-CCNC: 64 UNIT/L (ref 5–34)
BASOPHILS # BLD AUTO: 0.06 X10(3)/MCL
BASOPHILS NFR BLD AUTO: 1.1 %
BILIRUB SERPL-MCNC: 0.4 MG/DL
BUN SERPL-MCNC: 17.5 MG/DL (ref 9.8–20.1)
CALCIUM SERPL-MCNC: 9.6 MG/DL (ref 8.4–10.2)
CHLORIDE SERPL-SCNC: 106 MMOL/L (ref 98–107)
CO2 SERPL-SCNC: 26 MMOL/L (ref 22–29)
CREAT SERPL-MCNC: 0.89 MG/DL (ref 0.55–1.02)
EOSINOPHIL # BLD AUTO: 0.13 X10(3)/MCL (ref 0–0.9)
EOSINOPHIL NFR BLD AUTO: 2.5 %
ERYTHROCYTE [DISTWIDTH] IN BLOOD BY AUTOMATED COUNT: 13.8 % (ref 11.5–17)
GFR SERPLBLD CREATININE-BSD FMLA CKD-EPI: >60 MLS/MIN/1.73/M2
GLOBULIN SER-MCNC: 2.8 GM/DL (ref 2.4–3.5)
GLUCOSE SERPL-MCNC: 99 MG/DL (ref 74–100)
HCT VFR BLD AUTO: 43.5 % (ref 37–47)
HGB BLD-MCNC: 13.3 G/DL (ref 12–16)
IMM GRANULOCYTES # BLD AUTO: 0.02 X10(3)/MCL (ref 0–0.04)
IMM GRANULOCYTES NFR BLD AUTO: 0.4 %
LYMPHOCYTES # BLD AUTO: 1.2 X10(3)/MCL (ref 0.6–4.6)
LYMPHOCYTES NFR BLD AUTO: 22.7 %
MCH RBC QN AUTO: 30.1 PG (ref 27–31)
MCHC RBC AUTO-ENTMCNC: 30.6 G/DL (ref 33–36)
MCV RBC AUTO: 98.4 FL (ref 80–94)
MONOCYTES # BLD AUTO: 0.46 X10(3)/MCL (ref 0.1–1.3)
MONOCYTES NFR BLD AUTO: 8.7 %
NEUTROPHILS # BLD AUTO: 3.41 X10(3)/MCL (ref 2.1–9.2)
NEUTROPHILS NFR BLD AUTO: 64.6 %
NRBC BLD AUTO-RTO: 0 %
PLATELET # BLD AUTO: 252 X10(3)/MCL (ref 130–400)
PMV BLD AUTO: 10.1 FL (ref 7.4–10.4)
POTASSIUM SERPL-SCNC: 4.4 MMOL/L (ref 3.5–5.1)
PROT SERPL-MCNC: 7 GM/DL (ref 6.4–8.3)
RBC # BLD AUTO: 4.42 X10(6)/MCL (ref 4.2–5.4)
SODIUM SERPL-SCNC: 140 MMOL/L (ref 136–145)
WBC # SPEC AUTO: 5.28 X10(3)/MCL (ref 4.5–11.5)

## 2024-02-28 PROCEDURE — 85025 COMPLETE CBC W/AUTO DIFF WBC: CPT

## 2024-02-28 PROCEDURE — 80053 COMPREHEN METABOLIC PANEL: CPT

## 2024-02-28 PROCEDURE — 36415 COLL VENOUS BLD VENIPUNCTURE: CPT

## 2024-02-28 NOTE — PROGRESS NOTES
Labs reviewed. All of the lab results are completely normal but the liver enzymes are just slightly elevated. We will not increase -She can continue the current dose of methotrexate and we will need to recheck labs in 6 weeks to see if we need to adjust the methotrexate dose. Thanks    DARNELL Cortes NP

## 2024-02-28 NOTE — TELEPHONE ENCOUNTER
----- Message from RC Christine sent at 2/28/2024  2:08 PM CST -----  Labs reviewed. All of the lab results are completely normal but the liver enzymes are just slightly elevated. We will not increase -She can continue the current dose of methotrexate and we will need to recheck labs in 6 weeks to see if we need to adjust the methotrexate dose. Thanks    DARNELL Cortes, NP

## 2024-02-28 NOTE — TELEPHONE ENCOUNTER
Called patient left detailed voicemail regarding lab result and advised  The patient to call the office if she has any further questions.

## 2024-02-29 ENCOUNTER — TELEPHONE (OUTPATIENT)
Dept: RHEUMATOLOGY | Facility: CLINIC | Age: 59
End: 2024-02-29
Payer: COMMERCIAL

## 2024-04-09 NOTE — PROGRESS NOTES
Please call and let the patient know that labs are ordered for her to complete in the next few weeks. The reason we are repeating the labs is because she is on methotrexate and her liver enzymes were slightly elevated last time we checked them 6 weeks ago. The labs are already ordered.

## 2024-04-26 NOTE — PROGRESS NOTES
Subjective:           Patient ID: Lakshmi Zuñiga is a 58 y.o. female.    Chief Complaint: Pain and Follow-up (Left wrist pain . Pain under right foot . )      Ms. Zuñiga is a 57y/o female here for a f./u. She was in the [employer] [facility] [place] past. She was a previous patient of Dr. Stiles being treated for RA with only previous treatment of HCQ.  She established care with Dr. Caldera on 5/15/2023. She has history of psoriasis and has ridging of the nails and sausage fingers and toes. Dr. Caldera diagnosed her with psoriatic arthritis. Past labs: VIKTOR neg, RF Neg, CCP Neg. In May 2023 HCQ was discontinued and she was started on MTX 10 mg weekly. At her last office visit in August 2023 she was having bloating with low suspicion related to MTX as she had bloating before starting the MTX. 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    Followed by Cardiology: Gracie  of 2 stents 2015  Denies history of fevers, rashes, photosensitivity, oral or nasal ulcers, h/o MI, stroke, seizures, h/o PE or DVT, Raynaud's phenomenon, uveitis, malignancies.      Family history of autoimmune disease: Mother has RA  Pregnancies: 3  Miscarriages: 0  Smoking: Past smoker. (Quit smoking 12 years ago)    PCP at [facility] in [place] but unsure of her name.    January 23, 2024: Continues on MTX 10 mg weekly. Last month symptoms have started getting worse. She is now having Stiffness all day long. Last flare up about 1 month ago, left wrist red/warm/swollen joints. (At last visit she was well controlled on MTX 10 mg).  Denies recent infections since last office visit but does endorse a sick aching feeling.    Today April 2024- back for 3 months f/u after increasing MTX dose to 15 mg. She is having flare up approximately every 2 weeks.Most swelling in left wrist and right foot. Morning stiffness: over 30-60 minutes. She is using prednisone as needed for flare up. She has taken prednisone 3 times in the last 3  "months. Denies any recent infections requiring antibiotics since last office visit.         Review of Systems   Constitutional:  Negative for appetite change, chills and fever.   HENT:  Negative for congestion, ear pain, mouth sores, nosebleeds and trouble swallowing.    Eyes:  Negative for photophobia and discharge.   Respiratory:  Negative for chest tightness and shortness of breath.    Cardiovascular:  Negative for chest pain.   Gastrointestinal:  Negative for abdominal pain and vomiting.   Endocrine: Negative.    Genitourinary:  Negative for hematuria.   Musculoskeletal:  Positive for arthralgias and joint swelling.        As per HPI   Skin:  Negative for rash.   Neurological:  Negative for weakness.         Objective:   /64 (BP Location: Right arm, Patient Position: Sitting, BP Method: Medium (Automatic))   Pulse 60   Temp 97.7 °F (36.5 °C) (Oral)   Resp 18   Ht 5' 4" (1.626 m)   Wt 63.3 kg (139 lb 9.6 oz)   SpO2 99%   BMI 23.96 kg/m²          Physical Exam   Constitutional: She is oriented to person, place, and time. She appears well-developed and well-nourished. No distress.   HENT:   Head: Normocephalic and atraumatic.   Right Ear: External ear normal.   Left Ear: External ear normal.   Eyes: Pupils are equal, round, and reactive to light.   Cardiovascular: Normal rate.   Pulmonary/Chest: Effort normal.   Abdominal: Soft. There is no abdominal tenderness.   Musculoskeletal:      Right elbow: Normal.      Left elbow: Normal.      Right wrist: Normal.      Left wrist: Normal.      Cervical back: Neck supple.      Right hip: Normal.      Left hip: Normal.      Right knee: Normal.      Left knee: Normal.      Right ankle: Tenderness present.      Left ankle: Tenderness present.      Comments: Sausage fingers   Lymphadenopathy:     She has no cervical adenopathy.   Neurological: She is alert and oriented to person, place, and time. She displays normal reflexes. No cranial nerve deficit or sensory " deficit. She exhibits normal muscle tone. Coordination normal.   Skin: No rash noted. No erythema.   Vitals reviewed.      Right Side Rheumatological Exam     Examination finds the elbow, wrist, knee, 1st PIP, 1st MCP, 2nd PIP, 5th MCP, hip, 1st MTP, 2nd MTP, 3rd MTP, 4th MTP and 5th MTP normal.    The patient is tender to palpation of the 2nd MCP, 3rd PIP, 3rd MCP, 4th PIP, 4th MCP, 5th PIP and ankle    Left Side Rheumatological Exam     Examination finds the elbow, wrist, knee, 1st MCP, 2nd MCP, 3rd PIP, 4th MCP, hip, 1st MTP, 2nd MTP, 3rd MTP, 4th MTP and 5th MTP normal.    The patient is tender to palpation of the 1st PIP, 2nd PIP, 3rd MCP, 4th PIP, 5th PIP, 5th MCP and ankle.         No data to display     Assessment:         Medication List with Changes/Refills   New Medications    LEFLUNOMIDE (ARAVA) 20 MG TAB    Take 1 tablet (20 mg total) by mouth once daily.       Start Date: 4/29/2024 End Date: 4/29/2025   Current Medications    BISACODYL 5 MG TAB    Take 20 mg by mouth once daily.       Start Date: --        End Date: --    FLUOXETINE 20 MG CAPSULE    Take 20 mg by mouth once daily.       Start Date: --        End Date: --    IBUPROFEN (ADVIL,MOTRIN) 600 MG TABLET    Take 600 mg by mouth 2 (two) times daily as needed for Pain.       Start Date: --        End Date: --    LINZESS 145 MCG CAP CAPSULE    Take 145 mcg by mouth before breakfast.       Start Date: 3/4/2024  End Date: --    LORATADINE (CLARITIN) 10 MG TABLET    Take 10 mg by mouth once daily.       Start Date: --        End Date: --    PREDNISONE (DELTASONE) 5 MG TABLET    Take 1 tablet (5 mg total) by mouth daily as needed (for flare up).       Start Date: 1/23/2024 End Date: --    ROSUVASTATIN (CRESTOR) 40 MG TAB    Take 10 mg by mouth every evening. Taking 20 mg Bid       Start Date: --        End Date: --    TIZANIDINE (ZANAFLEX) 2 MG TABLET    Take 1 tablet (2 mg total) by mouth nightly.       Start Date: 8/30/2023 End Date: --    Discontinued Medications    FOLIC ACID (FOLVITE) 1 MG TABLET    Take 1 tablet (1 mg total) by mouth once daily. After food       Start Date: 8/30/2023 End Date: 4/29/2024    METHOTREXATE 2.5 MG TAB    Take 6 tablets (15 mg total) by mouth every 7 days. EVERY SUNDAY TAKE 3 TABS AFTER BREAKFAST AND 3 TABS AFTER SUPPER       Start Date: 1/23/2024 End Date: 4/29/2024         ICD-10-CM ICD-9-CM   1. Psoriatic arthritis  L40.50 696.0   2. Rheumatoid arthritis involving multiple sites with positive rheumatoid factor  M05.79 714.0   3. Drug-induced immunodeficiency  D84.821 279.3    Z79.899 E947.9   4. Insomnia, unspecified type  G47.00 780.52   5. Fibromyalgia syndrome  M79.7 729.1             Plan:       1. Psoriatic arthritis  Overview:  Previous patient of Dr. Stiles- she was being treated for RA and has only used HCQ in the past. During her initial visit with Dr. Caldera he noted that the patient has a history psoriasis, and now she has ridging of the nails and sausage fingers and toes and diagnosed her with psoriatic arthritis    Assessment & Plan:  MTX increased to 15 mg in January with improvement liver enzymes bumped up with increase of MTX then returned to normal. She continues on MTX 15 mg and is still having flare up every 2 weeks. She is using prednisone as needed for flare up. She has taken prednisone 3 times in the last 3 months.  She reports left wrist swelling and right foot. Will stop MTX and start leflunomide 20 mg daily.  Can consider future biologic therapy such as TNFi.    Stop Methotrexate 15 mg Q 7 days  Start Leflunomide 20 mg daily.  Continue prednisone 5 mg daily PRN for flare up. Discussed not to use daily, only as needed.    Labs in 6 weeks  RTC 3 months     She completed labs today- liver enzymes back WNL. Labs reviewed with the patient today during today's office visit.        Orders:  -     leflunomide (ARAVA) 20 MG Tab; Take 1 tablet (20 mg total) by mouth once daily.  Dispense: 90 tablet;  Refill: 2  -     Comprehensive Metabolic Panel; Future; Expected date: 06/10/2024  -     CBC Auto Differential; Future; Expected date: 06/10/2024    2. Rheumatoid arthritis involving multiple sites with positive rheumatoid factor  Assessment & Plan:  RF+ CCP+  Stop MTX, stop folic acid  Start Leflunomide 20 mg daily    Labs in 6 weeks  RTC 3 months  Can consider biologic therapy such as Anti TNFi in the futuer    Orders:  -     Comprehensive Metabolic Panel; Future; Expected date: 06/10/2024  -     CBC Auto Differential; Future; Expected date: 06/10/2024    3. Drug-induced immunodeficiency  Assessment & Plan:  Compromised immune system s/t autoimmune disease and use of immunosuppressive medications. (Leflunomide)  Hep serologies negative  - Monitor carefully for infections and toxicities.   - Advised strict adherence to age appropriate vaccinations and cancer screenings- including yearly skin exams  - Advised to get immediate medical care if any infection.   - hold Leflunomide with infections    Orders:  -     CBC Auto Differential; Future; Expected date: 06/10/2024    4. Insomnia, unspecified type  Assessment & Plan:  Avoid caffeine, alcohol and stimulants.  Power down electronic devices at least one hour prior to bedtime.  Keep room dark; use eye mask or relaxation sound machine to promote rest.  Sleep hygiene refers to actions that tend to improve and maintain good sleep:  Sleep as long as necessary to feel rested (usually seven to eight hours for adults) and then get out of bed  Maintain a regular sleep schedule, particularly a regular wake-up time in the morning  Avoid smoking or other nicotine intake, particularly during the evening         5. Fibromyalgia syndrome  Assessment & Plan:  Stable  Continue Tizanidine 2 mg nightly     Fibromyalgia is a chronic pain syndrome.  Underlying causes of fibromyalgia may be numerous.  An underlying nonrestorative sleep pattern, mental and physical stressors play a role in  pain perception.  Discussed importance of decreasing stress levels and improving sleep patterns/hygiene.   Exercise and a healthy life-style is important in management of this syndrome.

## 2024-04-26 NOTE — ASSESSMENT & PLAN NOTE
MTX increased to 15 mg in January with improvement liver enzymes bumped up with increase of MTX then returned to normal. She continues on MTX 15 mg and is still having flare up every 2 weeks. She is using prednisone as needed for flare up. She has taken prednisone 3 times in the last 3 months.  She reports left wrist swelling and right foot. Will stop MTX and start leflunomide 20 mg daily.  Can consider future biologic therapy such as TNFi.    Stop Methotrexate 15 mg Q 7 days  Start Leflunomide 20 mg daily.  Continue prednisone 5 mg daily PRN for flare up. Discussed not to use daily, only as needed.    Labs in 6 weeks  RTC 3 months     She completed labs today- liver enzymes back WNL. Labs reviewed with the patient today during today's office visit.

## 2024-04-26 NOTE — ASSESSMENT & PLAN NOTE
Stable  Continue Tizanidine 2 mg nightly     Fibromyalgia is a chronic pain syndrome.  Underlying causes of fibromyalgia may be numerous.  An underlying nonrestorative sleep pattern, mental and physical stressors play a role in pain perception.  Discussed importance of decreasing stress levels and improving sleep patterns/hygiene.   Exercise and a healthy life-style is important in management of this syndrome.

## 2024-04-26 NOTE — ASSESSMENT & PLAN NOTE
Compromised immune system s/t autoimmune disease and use of immunosuppressive medications. (Leflunomide)  Hep serologies negative  - Monitor carefully for infections and toxicities.   - Advised strict adherence to age appropriate vaccinations and cancer screenings- including yearly skin exams  - Advised to get immediate medical care if any infection.   - hold Leflunomide with infections

## 2024-04-29 ENCOUNTER — LAB VISIT (OUTPATIENT)
Dept: LAB | Facility: HOSPITAL | Age: 59
End: 2024-04-29
Payer: COMMERCIAL

## 2024-04-29 ENCOUNTER — OFFICE VISIT (OUTPATIENT)
Dept: RHEUMATOLOGY | Facility: CLINIC | Age: 59
End: 2024-04-29
Payer: COMMERCIAL

## 2024-04-29 VITALS
HEART RATE: 60 BPM | RESPIRATION RATE: 18 BRPM | SYSTOLIC BLOOD PRESSURE: 112 MMHG | TEMPERATURE: 98 F | BODY MASS INDEX: 23.84 KG/M2 | HEIGHT: 64 IN | OXYGEN SATURATION: 99 % | DIASTOLIC BLOOD PRESSURE: 64 MMHG | WEIGHT: 139.63 LBS

## 2024-04-29 DIAGNOSIS — Z79.899 DRUG-INDUCED IMMUNODEFICIENCY: ICD-10-CM

## 2024-04-29 DIAGNOSIS — G47.00 INSOMNIA, UNSPECIFIED TYPE: ICD-10-CM

## 2024-04-29 DIAGNOSIS — D84.821 DRUG-INDUCED IMMUNODEFICIENCY: ICD-10-CM

## 2024-04-29 DIAGNOSIS — M05.79 RHEUMATOID ARTHRITIS INVOLVING MULTIPLE SITES WITH POSITIVE RHEUMATOID FACTOR: ICD-10-CM

## 2024-04-29 DIAGNOSIS — L40.50 PSORIATIC ARTHRITIS: Primary | ICD-10-CM

## 2024-04-29 DIAGNOSIS — M79.7 FIBROMYALGIA SYNDROME: ICD-10-CM

## 2024-04-29 DIAGNOSIS — L40.50 PSORIATIC ARTHRITIS: ICD-10-CM

## 2024-04-29 LAB
ALBUMIN SERPL-MCNC: 4 G/DL (ref 3.5–5)
ALBUMIN/GLOB SERPL: 1.4 RATIO (ref 1.1–2)
ALP SERPL-CCNC: 57 UNIT/L (ref 40–150)
ALT SERPL-CCNC: 28 UNIT/L (ref 0–55)
AST SERPL-CCNC: 27 UNIT/L (ref 5–34)
BASOPHILS # BLD AUTO: 0.05 X10(3)/MCL
BASOPHILS NFR BLD AUTO: 0.8 %
BILIRUB SERPL-MCNC: 0.3 MG/DL
BUN SERPL-MCNC: 24.4 MG/DL (ref 9.8–20.1)
CALCIUM SERPL-MCNC: 9.4 MG/DL (ref 8.4–10.2)
CHLORIDE SERPL-SCNC: 112 MMOL/L (ref 98–107)
CO2 SERPL-SCNC: 25 MMOL/L (ref 22–29)
CREAT SERPL-MCNC: 1.01 MG/DL (ref 0.55–1.02)
EOSINOPHIL # BLD AUTO: 0.12 X10(3)/MCL (ref 0–0.9)
EOSINOPHIL NFR BLD AUTO: 1.9 %
ERYTHROCYTE [DISTWIDTH] IN BLOOD BY AUTOMATED COUNT: 14.7 % (ref 11.5–17)
GFR SERPLBLD CREATININE-BSD FMLA CKD-EPI: >60 MLS/MIN/1.73/M2
GLOBULIN SER-MCNC: 2.8 GM/DL (ref 2.4–3.5)
GLUCOSE SERPL-MCNC: 107 MG/DL (ref 74–100)
HCT VFR BLD AUTO: 40.9 % (ref 37–47)
HGB BLD-MCNC: 12.8 G/DL (ref 12–16)
IMM GRANULOCYTES # BLD AUTO: 0.01 X10(3)/MCL (ref 0–0.04)
IMM GRANULOCYTES NFR BLD AUTO: 0.2 %
LYMPHOCYTES # BLD AUTO: 1.36 X10(3)/MCL (ref 0.6–4.6)
LYMPHOCYTES NFR BLD AUTO: 21.8 %
MCH RBC QN AUTO: 30.2 PG (ref 27–31)
MCHC RBC AUTO-ENTMCNC: 31.3 G/DL (ref 33–36)
MCV RBC AUTO: 96.5 FL (ref 80–94)
MONOCYTES # BLD AUTO: 0.55 X10(3)/MCL (ref 0.1–1.3)
MONOCYTES NFR BLD AUTO: 8.8 %
NEUTROPHILS # BLD AUTO: 4.15 X10(3)/MCL (ref 2.1–9.2)
NEUTROPHILS NFR BLD AUTO: 66.5 %
NRBC BLD AUTO-RTO: 0 %
PLATELET # BLD AUTO: 242 X10(3)/MCL (ref 130–400)
PMV BLD AUTO: 10.4 FL (ref 7.4–10.4)
POTASSIUM SERPL-SCNC: 4.4 MMOL/L (ref 3.5–5.1)
PROT SERPL-MCNC: 6.8 GM/DL (ref 6.4–8.3)
RBC # BLD AUTO: 4.24 X10(6)/MCL (ref 4.2–5.4)
SODIUM SERPL-SCNC: 142 MMOL/L (ref 136–145)
WBC # SPEC AUTO: 6.24 X10(3)/MCL (ref 4.5–11.5)

## 2024-04-29 PROCEDURE — 1159F MED LIST DOCD IN RCRD: CPT | Mod: CPTII,S$GLB,,

## 2024-04-29 PROCEDURE — 3074F SYST BP LT 130 MM HG: CPT | Mod: CPTII,S$GLB,,

## 2024-04-29 PROCEDURE — 80053 COMPREHEN METABOLIC PANEL: CPT

## 2024-04-29 PROCEDURE — 99213 OFFICE O/P EST LOW 20 MIN: CPT | Mod: S$GLB,,,

## 2024-04-29 PROCEDURE — 36415 COLL VENOUS BLD VENIPUNCTURE: CPT

## 2024-04-29 PROCEDURE — 3008F BODY MASS INDEX DOCD: CPT | Mod: CPTII,S$GLB,,

## 2024-04-29 PROCEDURE — 85025 COMPLETE CBC W/AUTO DIFF WBC: CPT

## 2024-04-29 PROCEDURE — 3078F DIAST BP <80 MM HG: CPT | Mod: CPTII,S$GLB,,

## 2024-04-29 PROCEDURE — 99999 PR PBB SHADOW E&M-EST. PATIENT-LVL IV: CPT | Mod: PBBFAC,,,

## 2024-04-29 RX ORDER — LINACLOTIDE 145 UG/1
145 CAPSULE, GELATIN COATED ORAL
COMMUNITY
Start: 2024-03-04

## 2024-04-29 RX ORDER — LEFLUNOMIDE 20 MG/1
20 TABLET ORAL DAILY
Qty: 90 TABLET | Refills: 2 | Status: SHIPPED | OUTPATIENT
Start: 2024-04-29 | End: 2025-04-29

## 2024-04-29 NOTE — ASSESSMENT & PLAN NOTE
RF+ CCP+  Stop MTX, stop folic acid  Start Leflunomide 20 mg daily    Labs in 6 weeks  RTC 3 months  Can consider biologic therapy such as Anti TNFi in the futuer

## 2024-06-17 DIAGNOSIS — M79.7 FIBROMYALGIA SYNDROME: ICD-10-CM

## 2024-06-17 RX ORDER — TIZANIDINE 2 MG/1
2 TABLET ORAL NIGHTLY
Qty: 90 TABLET | Refills: 1 | Status: SHIPPED | OUTPATIENT
Start: 2024-06-17

## 2024-07-31 ENCOUNTER — OFFICE VISIT (OUTPATIENT)
Dept: RHEUMATOLOGY | Facility: CLINIC | Age: 59
End: 2024-07-31
Payer: COMMERCIAL

## 2024-07-31 ENCOUNTER — LAB VISIT (OUTPATIENT)
Dept: LAB | Facility: HOSPITAL | Age: 59
End: 2024-07-31
Payer: COMMERCIAL

## 2024-07-31 VITALS
RESPIRATION RATE: 18 BRPM | SYSTOLIC BLOOD PRESSURE: 119 MMHG | HEIGHT: 64 IN | HEART RATE: 70 BPM | WEIGHT: 134.38 LBS | TEMPERATURE: 98 F | BODY MASS INDEX: 22.94 KG/M2 | OXYGEN SATURATION: 99 % | DIASTOLIC BLOOD PRESSURE: 76 MMHG

## 2024-07-31 DIAGNOSIS — M79.7 FIBROMYALGIA SYNDROME: ICD-10-CM

## 2024-07-31 DIAGNOSIS — D84.821 DRUG-INDUCED IMMUNODEFICIENCY: ICD-10-CM

## 2024-07-31 DIAGNOSIS — M05.79 RHEUMATOID ARTHRITIS INVOLVING MULTIPLE SITES WITH POSITIVE RHEUMATOID FACTOR: ICD-10-CM

## 2024-07-31 DIAGNOSIS — Z79.899 DRUG-INDUCED IMMUNODEFICIENCY: ICD-10-CM

## 2024-07-31 DIAGNOSIS — M05.9 SEROPOSITIVE RHEUMATOID ARTHRITIS: Primary | ICD-10-CM

## 2024-07-31 DIAGNOSIS — G47.00 INSOMNIA, UNSPECIFIED TYPE: ICD-10-CM

## 2024-07-31 DIAGNOSIS — M05.9 SEROPOSITIVE RHEUMATOID ARTHRITIS: ICD-10-CM

## 2024-07-31 DIAGNOSIS — L40.50 PSORIATIC ARTHRITIS: ICD-10-CM

## 2024-07-31 LAB
ALBUMIN SERPL-MCNC: 4.2 G/DL (ref 3.5–5)
ALBUMIN/GLOB SERPL: 1.4 RATIO (ref 1.1–2)
ALP SERPL-CCNC: 67 UNIT/L (ref 40–150)
ALT SERPL-CCNC: 45 UNIT/L (ref 0–55)
ANION GAP SERPL CALC-SCNC: 8 MEQ/L
AST SERPL-CCNC: 45 UNIT/L (ref 5–34)
BASOPHILS # BLD AUTO: 0.05 X10(3)/MCL
BASOPHILS NFR BLD AUTO: 0.9 %
BILIRUB SERPL-MCNC: 0.5 MG/DL
BUN SERPL-MCNC: 21.1 MG/DL (ref 9.8–20.1)
CALCIUM SERPL-MCNC: 10.1 MG/DL (ref 8.4–10.2)
CHLORIDE SERPL-SCNC: 108 MMOL/L (ref 98–107)
CO2 SERPL-SCNC: 27 MMOL/L (ref 22–29)
CREAT SERPL-MCNC: 0.95 MG/DL (ref 0.55–1.02)
CREAT/UREA NIT SERPL: 22
CRP SERPL-MCNC: 2.6 MG/L
EOSINOPHIL # BLD AUTO: 0.12 X10(3)/MCL (ref 0–0.9)
EOSINOPHIL NFR BLD AUTO: 2.1 %
ERYTHROCYTE [DISTWIDTH] IN BLOOD BY AUTOMATED COUNT: 13.3 % (ref 11.5–17)
ERYTHROCYTE [SEDIMENTATION RATE] IN BLOOD: 7 MM/HR (ref 0–20)
GFR SERPLBLD CREATININE-BSD FMLA CKD-EPI: >60 ML/MIN/1.73/M2
GLOBULIN SER-MCNC: 3 GM/DL (ref 2.4–3.5)
GLUCOSE SERPL-MCNC: 99 MG/DL (ref 74–100)
HBV CORE AB SERPL QL IA: NONREACTIVE
HBV SURFACE AB SER-ACNC: 0.78 MIU/ML
HBV SURFACE AB SERPL IA-ACNC: NONREACTIVE M[IU]/ML
HBV SURFACE AG SERPL QL IA: NONREACTIVE
HCT VFR BLD AUTO: 42.9 % (ref 37–47)
HCV AB SERPL QL IA: NONREACTIVE
HGB BLD-MCNC: 13.2 G/DL (ref 12–16)
HIV 1+2 AB+HIV1 P24 AG SERPL QL IA: NONREACTIVE
IMM GRANULOCYTES # BLD AUTO: 0.01 X10(3)/MCL (ref 0–0.04)
IMM GRANULOCYTES NFR BLD AUTO: 0.2 %
LYMPHOCYTES # BLD AUTO: 1.16 X10(3)/MCL (ref 0.6–4.6)
LYMPHOCYTES NFR BLD AUTO: 20.4 %
MCH RBC QN AUTO: 30.1 PG (ref 27–31)
MCHC RBC AUTO-ENTMCNC: 30.8 G/DL (ref 33–36)
MCV RBC AUTO: 97.7 FL (ref 80–94)
MONOCYTES # BLD AUTO: 0.57 X10(3)/MCL (ref 0.1–1.3)
MONOCYTES NFR BLD AUTO: 10 %
NEUTROPHILS # BLD AUTO: 3.79 X10(3)/MCL (ref 2.1–9.2)
NEUTROPHILS NFR BLD AUTO: 66.4 %
NRBC BLD AUTO-RTO: 0 %
PLATELET # BLD AUTO: 266 X10(3)/MCL (ref 130–400)
PLATELETS.RETICULATED NFR BLD AUTO: 3.4 % (ref 0.9–11.2)
PMV BLD AUTO: 10.3 FL (ref 7.4–10.4)
POTASSIUM SERPL-SCNC: 4.8 MMOL/L (ref 3.5–5.1)
PROT SERPL-MCNC: 7.2 GM/DL (ref 6.4–8.3)
RBC # BLD AUTO: 4.39 X10(6)/MCL (ref 4.2–5.4)
SODIUM SERPL-SCNC: 143 MMOL/L (ref 136–145)
WBC # BLD AUTO: 5.7 X10(3)/MCL (ref 4.5–11.5)

## 2024-07-31 PROCEDURE — 99214 OFFICE O/P EST MOD 30 MIN: CPT | Mod: S$GLB,,,

## 2024-07-31 PROCEDURE — 36415 COLL VENOUS BLD VENIPUNCTURE: CPT

## 2024-07-31 PROCEDURE — 3074F SYST BP LT 130 MM HG: CPT | Mod: CPTII,S$GLB,,

## 2024-07-31 PROCEDURE — 86704 HEP B CORE ANTIBODY TOTAL: CPT

## 2024-07-31 PROCEDURE — 1159F MED LIST DOCD IN RCRD: CPT | Mod: CPTII,S$GLB,,

## 2024-07-31 PROCEDURE — 3008F BODY MASS INDEX DOCD: CPT | Mod: CPTII,S$GLB,,

## 2024-07-31 PROCEDURE — 85652 RBC SED RATE AUTOMATED: CPT

## 2024-07-31 PROCEDURE — 86140 C-REACTIVE PROTEIN: CPT

## 2024-07-31 PROCEDURE — 87389 HIV-1 AG W/HIV-1&-2 AB AG IA: CPT

## 2024-07-31 PROCEDURE — 80053 COMPREHEN METABOLIC PANEL: CPT

## 2024-07-31 PROCEDURE — 86706 HEP B SURFACE ANTIBODY: CPT

## 2024-07-31 PROCEDURE — 87340 HEPATITIS B SURFACE AG IA: CPT

## 2024-07-31 PROCEDURE — 86803 HEPATITIS C AB TEST: CPT

## 2024-07-31 PROCEDURE — 85025 COMPLETE CBC W/AUTO DIFF WBC: CPT

## 2024-07-31 PROCEDURE — 3078F DIAST BP <80 MM HG: CPT | Mod: CPTII,S$GLB,,

## 2024-07-31 PROCEDURE — 99999 PR PBB SHADOW E&M-EST. PATIENT-LVL IV: CPT | Mod: PBBFAC,,,

## 2024-07-31 PROCEDURE — 86480 TB TEST CELL IMMUN MEASURE: CPT

## 2024-07-31 RX ORDER — DOCUSATE SODIUM 100 MG/1
1 CAPSULE, LIQUID FILLED ORAL NIGHTLY
COMMUNITY

## 2024-07-31 RX ORDER — ADALIMUMAB 40MG/0.4ML
40 KIT SUBCUTANEOUS
Qty: 6 PEN | Refills: 3 | Status: SHIPPED | OUTPATIENT
Start: 2024-07-31

## 2024-07-31 NOTE — ASSESSMENT & PLAN NOTE
Stable  Continue Tizanidine 2 mg nightly     Fibromyalgia is a chronic pain syndrome.  Underlying causes of fibromyalgia may be numerous.  An underlying nonrestorative sleep pattern, mental and physical stressors play a role in pain perception.  Discussed importance of decreasing stress levels and improving sleep patterns/hygiene.   Exercise and a healthy life-style is important in management of this syndrome

## 2024-07-31 NOTE — ASSESSMENT & PLAN NOTE
RF+ CCP+. She was on MTX with continued flares. Leflunomide was started May 2023 with some improvement. She continues to flare about every 2 weeks, taking prednisone 5 mg PRN for flares.  Stop MTX, stop folic acid    Continue Leflunomide 20 mg daily  Start Humira 40 mg Q 14 days    Labs ordered today for continued monitoring including infectious disease.     Discussed the use of anti-TNF agents in inflammatory autoimmune disease.  TNF-alpha promotes the inflammatory response, which in turn causes many of the clinical problems associated with autoimmune disorders such as rheumatoid arthritis, ankylosing spondylitis, Crohn's disease, psoriasis, Inhibition of TNF-alpha can decrease inflammation and in turn stop or decrease joint damage.    This inhibition of TNF-alpha can be achieved with a monoclonal antibody such as infliximab (Remicade), adalimumab (Humira), certolizumab pegol (Cimzia), and golimumab (Simponi), or with a circulating receptor fusion protein such as etanercept (Enbrel).  These agents can be used as mono-therapy or in combination with other immunosuppressants.  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 Will try to do a 3 month supply of Rx

## 2024-07-31 NOTE — PROGRESS NOTES
Subjective:           Patient ID: Lakshmi Zuñiga is a 59 y.o. female.    Chief Complaint: Follow-up (Bilateral hands and wrist pain and swelling .)      Ms. Zuñiga is a 57y/o female here for a f./u. She was in the [facility] int he past. She was a previous patient of Dr. Stiles being treated for RA with only previous treatment of HCQ.  She established care with Dr. Caldera on 5/15/2023. She has history of psoriasis and has ridging of the nails and sausage fingers and toes. Dr. Caldera diagnosed her with psoriatic arthritis. Past labs: VIKTOR neg, RF +, CCP +. In May 2023 HCQ was discontinued and she was started on MTX 10 mg weekly. At her last office visit in August 2023 she was having bloating with low suspicion related to MTX as she had bloating before starting the MTX. 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    Followed by Cardiology: Dr. Bowman hx of 2 stents 2015  Denies history of fevers, rashes, photosensitivity, oral or nasal ulcers, h/o MI, stroke, seizures, h/o PE or DVT, Raynaud's phenomenon, uveitis, malignancies.      Family history of autoimmune disease: Mother has RA  Pregnancies: 3  Miscarriages: 0  Smoking: Past smoker. (Quit smoking 12 years ago)    PCP at [facility] in [place] but unsure of her name.    January 23, 2024: Continues on MTX 10 mg weekly. Last month symptoms have started getting worse. She is now having Stiffness all day long. Last flare up about 1 month ago, left wrist red/warm/swollen joints. (At last visit she was well controlled on MTX 10 mg).  Denies recent infections since last office visit but does endorse a sick aching feeling.    April 2024- back for 3 months f/u after increasing MTX dose to 15 mg. She is having flare up approximately every 2 weeks.Most swelling in left wrist and right foot. Morning stiffness: over 30-60 minutes. She is using prednisone as needed for flare up. She has taken prednisone 3 times in the last 3 months. Denies  "any recent infections requiring antibiotics since last office visit.    Today July 2024: Continues on Leflunomide. Reports Leflunomide has had some improvement but still not at goal. She is still having flare up every 2 weeks and having to use the prednisone as needed for flares. Morning stiffness: over 30 minutes and flares lasting days. She has pictures of swollen MCP, PIP. Denies any recent infections. 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         Review of Systems   Constitutional:  Negative for appetite change, chills and fever.   HENT:  Negative for congestion, ear pain, mouth sores, nosebleeds and trouble swallowing.    Eyes:  Negative for photophobia and discharge.   Respiratory:  Negative for chest tightness and shortness of breath.    Cardiovascular:  Negative for chest pain.   Gastrointestinal:  Negative for abdominal pain and vomiting.   Endocrine: Negative.    Genitourinary:  Negative for hematuria.   Musculoskeletal:  Positive for arthralgias and joint swelling.        As per HPI   Skin:  Negative for rash.   Neurological:  Negative for weakness.         Objective:   /76 (BP Location: Right arm, Patient Position: Sitting, BP Method: Medium (Automatic))   Pulse 70   Temp 98.2 °F (36.8 °C) (Oral)   Resp 18   Ht 5' 4" (1.626 m)   Wt 61 kg (134 lb 6.4 oz)   SpO2 99%   BMI 23.07 kg/m²          Physical Exam   Constitutional: She is oriented to person, place, and time. She appears well-developed and well-nourished. No distress.   HENT:   Head: Normocephalic and atraumatic.   Right Ear: External ear normal.   Left Ear: External ear normal.   Eyes: Pupils are equal, round, and reactive to light.   Cardiovascular: Normal rate.   Pulmonary/Chest: Effort normal.   Abdominal: Soft. There is no abdominal tenderness.   Musculoskeletal:      Right elbow: Normal.      Left elbow: Normal.      Right wrist: " Normal.      Left wrist: Normal.      Cervical back: Neck supple.      Right knee: Normal.      Left knee: Normal.      Right ankle: Tenderness present.      Left ankle: Tenderness present.      Comments: Sausage fingers   Lymphadenopathy:     She has no cervical adenopathy.   Neurological: She is alert and oriented to person, place, and time. She displays normal reflexes. No cranial nerve deficit or sensory deficit. She exhibits normal muscle tone. Coordination normal.   Skin: No rash noted. No erythema.   Vitals reviewed.      Right Side Rheumatological Exam     Examination finds the elbow, wrist, knee, 1st PIP, 1st MCP, 2nd PIP and 5th MCP normal.    The patient is tender to palpation of the 2nd MCP, 3rd PIP, 3rd MCP, 4th PIP, 4th MCP, 5th PIP and ankle    Left Side Rheumatological Exam     Examination finds the elbow, wrist, knee, 1st MCP, 2nd MCP, 3rd PIP and 4th MCP normal.    The patient is tender to palpation of the 1st PIP, 2nd PIP, 3rd MCP, 4th PIP, 5th PIP, 5th MCP and ankle.         No data to display     Assessment:         Medication List with Changes/Refills   New Medications    ADALIMUMAB (HUMIRA,CF, PEN) 40 MG/0.4 ML PNKT    Inject 0.4 mLs (40 mg total) into the skin every 14 (fourteen) days. Hold with fever or infection.       Start Date: 7/31/2024 End Date: --   Current Medications    BISACODYL 5 MG TAB    Take 20 mg by mouth once daily.       Start Date: --        End Date: --    DOCUSATE SODIUM (COLACE) 100 MG CAPSULE    Take 1 capsule by mouth every evening.       Start Date: --        End Date: --    FLUOXETINE 20 MG CAPSULE    Take 20 mg by mouth once daily.       Start Date: --        End Date: --    IBUPROFEN (ADVIL,MOTRIN) 600 MG TABLET    Take 600 mg by mouth 2 (two) times daily as needed for Pain.       Start Date: --        End Date: --    LEFLUNOMIDE (ARAVA) 20 MG TAB    Take 1 tablet (20 mg total) by mouth once daily.       Start Date: 4/29/2024 End Date: 4/29/2025    LORATADINE  (CLARITIN) 10 MG TABLET    Take 10 mg by mouth daily as needed.       Start Date: --        End Date: --    PLECANATIDE (TRULANCE) 3 MG TAB    Take 3 mg by mouth nightly.       Start Date: --        End Date: --    PREDNISONE (DELTASONE) 5 MG TABLET    Take 1 tablet (5 mg total) by mouth daily as needed (for flare up).       Start Date: 1/23/2024 End Date: --    ROSUVASTATIN (CRESTOR) 40 MG TAB    Take 10 mg by mouth every evening. Taking 20 mg Bid       Start Date: --        End Date: --    TIZANIDINE (ZANAFLEX) 2 MG TABLET    TAKE ONE TABLET BY MOUTH NIGHTLY       Start Date: 6/17/2024 End Date: --   Discontinued Medications    LINZESS 145 MCG CAP CAPSULE    Take 145 mcg by mouth before breakfast.       Start Date: 3/4/2024  End Date: 7/31/2024         ICD-10-CM ICD-9-CM   1. Seropositive rheumatoid arthritis  M05.9 714.0   2. Drug-induced immunodeficiency  D84.821 279.3    Z79.899 E947.9   3. Fibromyalgia syndrome  M79.7 729.1   4. Insomnia, unspecified type  G47.00 780.52               Plan:       1. Seropositive rheumatoid arthritis  Assessment & Plan:  RF+ CCP+. She was on MTX with continued flares. Leflunomide was started May 2023 with some improvement. She continues to flare about every 2 weeks, taking prednisone 5 mg PRN for flares.  Stop MTX, stop folic acid    Continue Leflunomide 20 mg daily  Start Humira 40 mg Q 14 days    Labs ordered today for continued monitoring including infectious disease.     Discussed the use of anti-TNF agents in inflammatory autoimmune disease.  TNF-alpha promotes the inflammatory response, which in turn causes many of the clinical problems associated with autoimmune disorders such as rheumatoid arthritis, ankylosing spondylitis, Crohn's disease, psoriasis, Inhibition of TNF-alpha can decrease inflammation and in turn stop or decrease joint damage.    This inhibition of TNF-alpha can be achieved with a monoclonal antibody such as infliximab (Remicade), adalimumab (Humira),  certolizumab pegol (Cimzia), and golimumab (Simponi), or with a circulating receptor fusion protein such as etanercept (Enbrel).  These agents can be used as mono-therapy or in combination with other immunosuppressants.  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 Will try to do a 3 month supply of Rx      Orders:  -     HIV 1/2 Ag/Ab (4th Gen); Future; Expected date: 07/31/2024  -     Quantiferon Gold TB; Future; Expected date: 07/31/2024  -     Hepatitis C Antibody; Future; Expected date: 07/31/2024  -     Hepatitis B Surface Antigen; Future; Expected date: 07/31/2024  -     Hepatitis B Core Antibody, Total; Future; Expected date: 07/31/2024  -     Hepatitis B Surface Ab, Qualitative; Future; Expected date: 07/31/2024  -     Sedimentation rate; Future; Expected date: 07/31/2024  -     C-Reactive Protein; Future; Expected date: 07/31/2024  -     Comprehensive Metabolic Panel; Future; Expected date: 07/31/2024  -     CBC Auto Differential; Future; Expected date: 07/31/2024  -     adalimumab (HUMIRA,CF, PEN) 40 mg/0.4 mL PnKt; Inject 0.4 mLs (40 mg total) into the skin every 14 (fourteen) days. Hold with fever or infection.  Dispense: 6 pen ; Refill: 3    2. Drug-induced immunodeficiency  Assessment & Plan:  Compromised immune system s/t autoimmune disease and use of immunosuppressive medications. (Leflunomide)  Hep serologies negative  - Monitor carefully for infections and toxicities.   - Advised strict adherence to age appropriate vaccinations and cancer screenings- including yearly skin exams  - Advised to get immediate medical care if any infection.   - hold Leflunomide with infections    Orders:  -     CBC Auto Differential; Future; Expected date: 07/31/2024    3. Fibromyalgia syndrome  Assessment &  Plan:  Stable  Continue Tizanidine 2 mg nightly     Fibromyalgia is a chronic pain syndrome.  Underlying causes of fibromyalgia may be numerous.  An underlying nonrestorative sleep pattern, mental and physical stressors play a role in pain perception.  Discussed importance of decreasing stress levels and improving sleep patterns/hygiene.   Exercise and a healthy life-style is important in management of this syndrome      4. Insomnia, unspecified type  Assessment & Plan:  Avoid caffeine, alcohol and stimulants.  Power down electronic devices at least one hour prior to bedtime.  Keep room dark; use eye mask or relaxation sound machine to promote rest.  Sleep hygiene refers to actions that tend to improve and maintain good sleep:  Sleep as long as necessary to feel rested (usually seven to eight hours for adults) and then get out of bed  Maintain a regular sleep schedule, particularly a regular wake-up time in the morning  Avoid smoking or other nicotine intake, particularly during the evening

## 2024-08-02 LAB
GAMMA INTERFERON BACKGROUND BLD IA-ACNC: 0.04 IU/ML
M TB IFN-G BLD-IMP: NEGATIVE
M TB IFN-G CD4+ BCKGRND COR BLD-ACNC: 0 IU/ML
M TB IFN-G CD4+CD8+ BCKGRND COR BLD-ACNC: 0 IU/ML
MITOGEN IGNF BCKGRD COR BLD-ACNC: >10 IU/ML

## 2024-08-05 ENCOUNTER — TELEPHONE (OUTPATIENT)
Dept: RHEUMATOLOGY | Facility: CLINIC | Age: 59
End: 2024-08-05
Payer: COMMERCIAL

## 2024-08-07 DIAGNOSIS — M05.9 SEROPOSITIVE RHEUMATOID ARTHRITIS: ICD-10-CM

## 2024-08-07 RX ORDER — ADALIMUMAB 40MG/0.4ML
40 KIT SUBCUTANEOUS
Qty: 6 PEN | Refills: 3 | Status: SHIPPED | OUTPATIENT
Start: 2024-08-07

## 2024-08-16 ENCOUNTER — TELEPHONE (OUTPATIENT)
Dept: RHEUMATOLOGY | Facility: CLINIC | Age: 59
End: 2024-08-16
Payer: COMMERCIAL

## 2024-08-16 NOTE — TELEPHONE ENCOUNTER
Spoke with [facility] pharmacy . I did give the Authorization information to be update on their end . I was told that it may take 1-2 business days for it to update. I did also advise the patient .

## 2024-08-27 DIAGNOSIS — M79.7 FIBROMYALGIA SYNDROME: ICD-10-CM

## 2024-08-27 RX ORDER — TIZANIDINE 2 MG/1
2 TABLET ORAL NIGHTLY
Qty: 90 TABLET | Refills: 1 | Status: SHIPPED | OUTPATIENT
Start: 2024-08-27

## 2024-10-21 DIAGNOSIS — L40.50 PSORIATIC ARTHRITIS: ICD-10-CM

## 2024-10-21 RX ORDER — LEFLUNOMIDE 20 MG/1
20 TABLET ORAL DAILY
Qty: 90 TABLET | Refills: 1 | Status: SHIPPED | OUTPATIENT
Start: 2024-10-21 | End: 2025-10-21

## 2024-11-13 NOTE — PROGRESS NOTES
Subjective:           Patient ID: Lakshmi Zuñiga is a 59 y.o. female.    Chief Complaint: Follow-up (Red rash behind right knee. Complaints that it is itchy at times. )      Ms. Zuñiga is a 57y/o female here for a f./u. She was in the [facility] int he past. She was a previous patient of Dr. Stiles being treated for RA with only previous treatment of HCQ.  She established care with Dr. Caldera on 5/15/2023. She has history of psoriasis and has ridging of the nails and sausage fingers and toes. Dr. Caldera diagnosed her with psoriatic arthritis. Past labs: VIKTOR neg, RF +, CCP +. In May 2023 HCQ was discontinued and she was started on MTX 10 mg weekly. At her last office visit in August 2023 she was having bloating with low suspicion related to MTX as she had bloating before starting the MTX. 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    Followed by Cardiology: Dr. Bowman hx of 2 stents 2015  Denies history of fevers, rashes, photosensitivity, oral or nasal ulcers, h/o MI, stroke, seizures, h/o PE or DVT, Raynaud's phenomenon, uveitis, malignancies.      Family history of autoimmune disease: Mother has RA  Pregnancies: 3  Miscarriages: 0  Smoking: Past smoker. (Quit smoking 12 years ago)    PCP at [facility] in [place] but unsure of her name.    January 23, 2024: Continues on MTX 10 mg weekly. Last month symptoms have started getting worse. She is now having Stiffness all day long. Last flare up about 1 month ago, left wrist red/warm/swollen joints. (At last visit she was well controlled on MTX 10 mg).  Denies recent infections since last office visit but does endorse a sick aching feeling.    April 2024- back for 3 months f/u after increasing MTX dose to 15 mg. She is having flare up approximately every 2 weeks.Most swelling in left wrist and right foot. Morning stiffness: over 30-60 minutes. She is using prednisone as needed for flare up. She has taken prednisone 3 times in the  last 3 months. Denies any recent infections requiring antibiotics since last office visit.    Today July 2024: Continues on Leflunomide. Reports Leflunomide has had some improvement but still not at goal. She is still having flare up every 2 weeks and having to use the prednisone as needed for flares. Morning stiffness: over 30 minutes and flares lasting days. She has pictures of swollen MCP, PIP. Denies any recent infections. 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    Today November 2024: Continues on Leflunomide 20 mg daily. She was prescribed Humira at the last office visit and did not start taking it because she was unable to get a 90 day supply at one time and she works offshore. Reports morning stiffness about 30 minutes, endorses occasional warm/swollen joints (more when she over exerts herself or repetitive movement). Doing well on current regimen, reports decrease frequency of flares, she is not needing to take prednisone. Continues to exercise 5-6 days/week. Today she does report red spot behind right knee. Started about 45 days ago, it is not improving or getting worse, she did use antifungal cream with mild improvement. Reports when she was younger in middle school she had rashes behind both knees, eventually evaluated by a Dermatologist but unsure of what the diagnosis was. Denies any recent infections. Doing well today           Review of Systems   Constitutional:  Negative for appetite change, chills and fever.   HENT:  Negative for congestion, ear pain, mouth sores, nosebleeds and trouble swallowing.    Eyes:  Negative for photophobia and discharge.   Respiratory:  Negative for chest tightness and shortness of breath.    Cardiovascular:  Negative for chest pain.   Gastrointestinal:  Negative for abdominal pain and vomiting.   Endocrine: Negative.    Genitourinary:  Negative for hematuria.  "  Musculoskeletal:  Positive for arthralgias.        As per HPI   Skin:  Negative for rash.   Neurological:  Negative for weakness.         Objective:   /75 (BP Location: Left arm, Patient Position: Sitting)   Pulse 74   Temp 98.9 °F (37.2 °C) (Oral)   Resp 18   Ht 5' 4" (1.626 m)   Wt 61.5 kg (135 lb 9.6 oz)   SpO2 97%   BMI 23.28 kg/m²          Physical Exam   Constitutional: She is oriented to person, place, and time. She appears well-developed and well-nourished. No distress.   HENT:   Head: Normocephalic and atraumatic.   Right Ear: External ear normal.   Left Ear: External ear normal.   Eyes: Pupils are equal, round, and reactive to light.   Cardiovascular: Normal rate.   Pulmonary/Chest: Effort normal.   Abdominal: Soft. There is no abdominal tenderness.   Musculoskeletal:      Right elbow: Normal.      Left elbow: Normal.      Right wrist: Normal.      Left wrist: Normal.      Cervical back: Neck supple.      Right ankle: Tenderness present.      Left ankle: Tenderness present.      Comments: Sausage fingers   Lymphadenopathy:     She has no cervical adenopathy.   Neurological: She is alert and oriented to person, place, and time. She displays normal reflexes. No cranial nerve deficit or sensory deficit. She exhibits normal muscle tone. Coordination normal.   Skin: No rash noted. No erythema.   Vitals reviewed.      Right Side Rheumatological Exam     Examination finds the elbow, wrist, 1st PIP, 1st MCP, 2nd PIP, 3rd PIP, 3rd MCP, 4th PIP, 4th MCP, 5th PIP and 5th MCP normal.    The patient is tender to palpation of the 2nd MCP and ankle    Left Side Rheumatological Exam     Examination finds the elbow, wrist, 1st PIP, 1st MCP, 2nd PIP, 2nd MCP, 3rd PIP, 3rd MCP, 4th PIP, 4th MCP, 5th PIP and 5th MCP normal.    The patient is tender to palpation of the ankle.         No data to display     Assessment:         Medication List with Changes/Refills   Current Medications    BISACODYL 5 MG TAB    " Take 20 mg by mouth once daily.       Start Date: --        End Date: --    DOCUSATE SODIUM (COLACE) 100 MG CAPSULE    Take 1 capsule by mouth every evening.       Start Date: --        End Date: --    FLUOXETINE 20 MG CAPSULE    Take 20 mg by mouth once daily.       Start Date: --        End Date: --    IBUPROFEN (ADVIL,MOTRIN) 600 MG TABLET    Take 600 mg by mouth 2 (two) times daily as needed for Pain.       Start Date: --        End Date: --    LEFLUNOMIDE (ARAVA) 20 MG TAB    TAKE 1 TABLET (20 MG TOTAL) BY MOUTH ONCE DAILY.       Start Date: 10/21/2024End Date: 10/21/2025    LORATADINE (CLARITIN) 10 MG TABLET    Take 10 mg by mouth daily as needed.       Start Date: --        End Date: --    PLECANATIDE (TRULANCE) 3 MG TAB    Take 3 mg by mouth nightly.       Start Date: --        End Date: --    PREDNISONE (DELTASONE) 5 MG TABLET    Take 1 tablet (5 mg total) by mouth daily as needed (for flare up).       Start Date: 1/23/2024 End Date: --    ROSUVASTATIN (CRESTOR) 40 MG TAB    Take 10 mg by mouth every evening. Taking 20 mg Bid       Start Date: --        End Date: --    TIZANIDINE (ZANAFLEX) 2 MG TABLET    Take 1 tablet (2 mg total) by mouth every evening.       Start Date: 8/27/2024 End Date: --    TRIAZOLAM 0.25 MG TABLET    Take 0.25 mg by mouth.       Start Date: 11/12/2024End Date: --   Discontinued Medications    ADALIMUMAB (HUMIRA,CF, PEN) 40 MG/0.4 ML PNKT    Inject 0.4 mLs (40 mg total) into the skin every 14 (fourteen) days. Hold with fever or infection.       Start Date: 8/7/2024  End Date: 11/14/2024         ICD-10-CM ICD-9-CM   1. Seropositive rheumatoid arthritis  M05.9 714.0   2. Fibromyalgia syndrome  M79.7 729.1   3. Drug-induced immunodeficiency  D84.821 279.3    Z79.899 E947.9           Plan:       1. Seropositive rheumatoid arthritis  Assessment & Plan:  RF+ CCP+. She was on MTX with continued flares. Leflunomide was started May 2023 with some improvement. 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 Minimal morning stiffness, decrease frequency of flare ups.      Continue Leflunomide 20 mg daily     Labs ordered today for continued monitoring          Orders:  -     Comprehensive Metabolic Panel; Future; Expected date: 11/14/2024  -     CBC Auto Differential; Future; Expected date: 11/14/2024    2. Fibromyalgia syndrome  Assessment & Plan:  Stable  Continue Tizanidine 2 mg nightly     Fibromyalgia is a chronic pain syndrome.  Underlying causes of fibromyalgia may be numerous.  An underlying nonrestorative sleep pattern, mental and physical stressors play a role in pain perception.  Discussed importance of decreasing stress levels and improving sleep patterns/hygiene.   Exercise and a healthy life-style is important in management of this syndrome      3. Drug-induced immunodeficiency  Assessment & Plan:  Compromised immune system s/t autoimmune disease and use of immunosuppressive medications. (Leflunomide)  Hep serologies negative  - Monitor carefully for infections and toxicities.   - Advised strict adherence to age appropriate vaccinations and cancer screenings- including yearly skin exams  - Advised to get immediate medical care if any infection.   - hold Leflunomide with infections    Orders:  -     CBC Auto Differential; Future; Expected date: 11/14/2024

## 2024-11-14 ENCOUNTER — OFFICE VISIT (OUTPATIENT)
Dept: RHEUMATOLOGY | Facility: CLINIC | Age: 59
End: 2024-11-14
Payer: COMMERCIAL

## 2024-11-14 VITALS
RESPIRATION RATE: 18 BRPM | BODY MASS INDEX: 23.15 KG/M2 | OXYGEN SATURATION: 97 % | SYSTOLIC BLOOD PRESSURE: 124 MMHG | TEMPERATURE: 99 F | WEIGHT: 135.63 LBS | HEART RATE: 74 BPM | HEIGHT: 64 IN | DIASTOLIC BLOOD PRESSURE: 75 MMHG

## 2024-11-14 DIAGNOSIS — Z79.899 DRUG-INDUCED IMMUNODEFICIENCY: ICD-10-CM

## 2024-11-14 DIAGNOSIS — D84.821 DRUG-INDUCED IMMUNODEFICIENCY: ICD-10-CM

## 2024-11-14 DIAGNOSIS — M05.9 SEROPOSITIVE RHEUMATOID ARTHRITIS: Primary | ICD-10-CM

## 2024-11-14 DIAGNOSIS — M79.7 FIBROMYALGIA SYNDROME: ICD-10-CM

## 2024-11-14 PROCEDURE — 3074F SYST BP LT 130 MM HG: CPT | Mod: CPTII,S$GLB,,

## 2024-11-14 PROCEDURE — 3008F BODY MASS INDEX DOCD: CPT | Mod: CPTII,S$GLB,,

## 2024-11-14 PROCEDURE — 99999 PR PBB SHADOW E&M-EST. PATIENT-LVL IV: CPT | Mod: PBBFAC,,,

## 2024-11-14 PROCEDURE — 99214 OFFICE O/P EST MOD 30 MIN: CPT | Mod: S$GLB,,,

## 2024-11-14 PROCEDURE — 1159F MED LIST DOCD IN RCRD: CPT | Mod: CPTII,S$GLB,,

## 2024-11-14 PROCEDURE — 3078F DIAST BP <80 MM HG: CPT | Mod: CPTII,S$GLB,,

## 2024-11-14 RX ORDER — TRIAZOLAM 0.25 MG/1
0.25 TABLET ORAL
COMMUNITY
Start: 2024-11-12

## 2024-12-06 DIAGNOSIS — M79.7 FIBROMYALGIA SYNDROME: ICD-10-CM

## 2024-12-09 RX ORDER — TIZANIDINE 2 MG/1
2 TABLET ORAL NIGHTLY
Qty: 90 TABLET | Refills: 1 | Status: SHIPPED | OUTPATIENT
Start: 2024-12-09

## 2025-04-15 NOTE — ASSESSMENT & PLAN NOTE
RF+ CCP+. She was on MTX with continued flares. Leflunomide was started May 2023 with some improvement. 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 She does c/o of swelling of right foot- denies pain or discomfort but swelling noted. Reports swelling has been worse the last 2 weeks.     Continue Leflunomide 20 mg daily     Labs ordered today for continued monitoring   Baseline bilateral feet xray ordered  Hand Xray completed 2023

## 2025-04-16 ENCOUNTER — HOSPITAL ENCOUNTER (OUTPATIENT)
Dept: RADIOLOGY | Facility: HOSPITAL | Age: 60
Discharge: HOME | End: 2025-04-16
Payer: COMMERCIAL

## 2025-04-16 ENCOUNTER — OFFICE VISIT (OUTPATIENT)
Dept: RHEUMATOLOGY | Facility: CLINIC | Age: 60
End: 2025-04-16
Payer: COMMERCIAL

## 2025-04-16 ENCOUNTER — RESULTS FOLLOW-UP (OUTPATIENT)
Dept: RHEUMATOLOGY | Facility: CLINIC | Age: 60
End: 2025-04-16

## 2025-04-16 VITALS
OXYGEN SATURATION: 97 % | DIASTOLIC BLOOD PRESSURE: 67 MMHG | RESPIRATION RATE: 18 BRPM | WEIGHT: 138.63 LBS | HEIGHT: 64 IN | TEMPERATURE: 98 F | BODY MASS INDEX: 23.67 KG/M2 | HEART RATE: 63 BPM | SYSTOLIC BLOOD PRESSURE: 122 MMHG

## 2025-04-16 DIAGNOSIS — M05.9 SEROPOSITIVE RHEUMATOID ARTHRITIS: ICD-10-CM

## 2025-04-16 DIAGNOSIS — D84.821 DRUG-INDUCED IMMUNODEFICIENCY: ICD-10-CM

## 2025-04-16 DIAGNOSIS — M79.7 FIBROMYALGIA SYNDROME: ICD-10-CM

## 2025-04-16 DIAGNOSIS — L40.50 PSORIATIC ARTHRITIS: ICD-10-CM

## 2025-04-16 DIAGNOSIS — Z79.899 DRUG-INDUCED IMMUNODEFICIENCY: ICD-10-CM

## 2025-04-16 DIAGNOSIS — M05.9 SEROPOSITIVE RHEUMATOID ARTHRITIS: Primary | ICD-10-CM

## 2025-04-16 PROCEDURE — 1159F MED LIST DOCD IN RCRD: CPT | Mod: CPTII,S$GLB,,

## 2025-04-16 PROCEDURE — 3008F BODY MASS INDEX DOCD: CPT | Mod: CPTII,S$GLB,,

## 2025-04-16 PROCEDURE — 73630 X-RAY EXAM OF FOOT: CPT | Mod: TC,RT

## 2025-04-16 PROCEDURE — 99214 OFFICE O/P EST MOD 30 MIN: CPT | Mod: S$GLB,,,

## 2025-04-16 PROCEDURE — 99999 PR PBB SHADOW E&M-EST. PATIENT-LVL IV: CPT | Mod: PBBFAC,,,

## 2025-04-16 PROCEDURE — 73630 X-RAY EXAM OF FOOT: CPT | Mod: TC,LT

## 2025-04-16 PROCEDURE — 3074F SYST BP LT 130 MM HG: CPT | Mod: CPTII,S$GLB,,

## 2025-04-16 PROCEDURE — 3078F DIAST BP <80 MM HG: CPT | Mod: CPTII,S$GLB,,

## 2025-04-16 RX ORDER — LEFLUNOMIDE 20 MG/1
20 TABLET ORAL DAILY
Qty: 90 TABLET | Refills: 1 | Status: SHIPPED | OUTPATIENT
Start: 2025-04-16

## 2025-04-16 RX ORDER — HYOSCYAMINE SULFATE 0.125 MG
0.12 TABLET ORAL EVERY 6 HOURS PRN
COMMUNITY
Start: 2025-03-27 | End: 2025-04-16

## 2025-04-16 RX ORDER — SENNOSIDES 8.6 MG/1
2 TABLET ORAL NIGHTLY
COMMUNITY

## 2025-04-16 RX ORDER — TIZANIDINE 2 MG/1
2 TABLET ORAL NIGHTLY
Qty: 90 TABLET | Refills: 1 | Status: SHIPPED | OUTPATIENT
Start: 2025-04-16

## 2025-04-16 NOTE — PROGRESS NOTES
Subjective:           Patient ID: Lakshmi Zuñiga is a 59 y.o. female.    Chief Complaint: Rheumatoid Arthritis and Follow-up (Top of right foot swelling and feeling uncomfortable.)      Ms. Zuñiga is a 57y/o female here for a f./u. She was in the [facility] int he past. She was a previous patient of Dr. Stiles being treated for RA with only previous treatment of HCQ.  She established care with Dr. Caldera on 5/15/2023. She has history of psoriasis and has ridging of the nails and sausage fingers and toes. Dr. Caldera diagnosed her with psoriatic arthritis. Past labs: VIKTOR neg, RF +, CCP +. In May 2023 HCQ was discontinued and she was started on MTX 10 mg weekly. At her last office visit in August 2023 she was having bloating with low suspicion related to MTX as she had bloating before starting the MTX. 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    Followed by Cardiology: Dr. Bowman hx of 2 stents 2015  Denies history of fevers, rashes, photosensitivity, oral or nasal ulcers, h/o MI, stroke, seizures, h/o PE or DVT, Raynaud's phenomenon, uveitis, malignancies.      Family history of autoimmune disease: Mother has RA  Pregnancies: 3  Miscarriages: 0  Smoking: Past smoker.  QUIT 2013-SMOKED FOR 20 YEARS 1 PACK/DAY    PCP at [facility] in [place] but unsure of her name.    January 23, 2024: Continues on MTX 10 mg weekly. Last month symptoms have started getting worse. She is now having Stiffness all day long. Last flare up about 1 month ago, left wrist red/warm/swollen joints. (At last visit she was well controlled on MTX 10 mg).  Denies recent infections since last office visit but does endorse a sick aching feeling.    April 2024- back for 3 months f/u after increasing MTX dose to 15 mg. She is having flare up approximately every 2 weeks.Most swelling in left wrist and right foot. Morning stiffness: over 30-60 minutes. She is using prednisone as needed for flare up. She has taken  prednisone 3 times in the last 3 months. Denies any recent infections requiring antibiotics since last office visit.    July 2024: Continues on Leflunomide. Reports Leflunomide has had some improvement but still not at goal. She is still having flare up every 2 weeks and having to use the prednisone as needed for flares. Morning stiffness: over 30 minutes and flares lasting days. She has pictures of swollen MCP, PIP. Denies any recent infections. 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    November 2024: Continues on Leflunomide 20 mg daily. She was prescribed Humira at the last office visit and did not start taking it because she was unable to get a 90 day supply at one time and she works offshore. Reports morning stiffness about 30 minutes, endorses occasional warm/swollen joints (more when she over exerts herself or repetitive movement). Doing well on current regimen, reports decrease frequency of flares, she is not needing to take prednisone. Continues to exercise 5-6 days/week. Today she does report red spot behind right knee. Started about 45 days ago, it is not improving or getting worse, she did use antifungal cream with mild improvement. Reports when she was younger in middle school she had rashes behind both knees, eventually evaluated by a Dermatologist but unsure of what the diagnosis was. Denies any recent infections. Doing well today    Today April 2025. Continues on Leflunomide 20 mg daily. Morning stiffness less than 30 minutes. Continues with less flares than prior. She does c/o of swelling of right foot- denies pain or discomfort but swelling noted. Reports swelling has been worse the last 2 weeks. Continues with pain after repetitive movement (such as cutting vegetables). Does report recent kidney stones s/p stone extraction laser lithotripsy. Denies any recent infections.           Review of Systems  "  Constitutional:  Negative for appetite change, chills and fever.   HENT:  Negative for congestion, ear pain, mouth sores, nosebleeds and trouble swallowing.    Eyes:  Negative for photophobia and discharge.   Respiratory:  Negative for chest tightness and shortness of breath.    Cardiovascular:  Negative for chest pain.   Gastrointestinal:  Negative for abdominal pain and vomiting.   Endocrine: Negative.    Genitourinary:  Negative for hematuria.   Musculoskeletal:  Positive for arthralgias.        As per HPI  Swelling of R MTP   Skin:  Negative for rash.   Neurological:  Negative for weakness.         Objective:   /67 (BP Location: Left arm, Patient Position: Sitting)   Pulse 63   Temp 98.1 °F (36.7 °C) (Oral)   Resp 18   Ht 5' 4" (1.626 m)   Wt 62.9 kg (138 lb 9.6 oz)   SpO2 97%   BMI 23.79 kg/m²          Physical Exam   Constitutional: She is oriented to person, place, and time. She appears well-developed and well-nourished. No distress.   HENT:   Head: Normocephalic and atraumatic.   Right Ear: External ear normal.   Left Ear: External ear normal.   Eyes: Pupils are equal, round, and reactive to light.   Cardiovascular: Normal rate.   Pulmonary/Chest: Effort normal.   Abdominal: Soft. There is no abdominal tenderness.   Musculoskeletal:      Right elbow: Normal.      Left elbow: Normal.      Right wrist: Normal.      Left wrist: Normal.      Cervical back: Neck supple.      Comments: Sausage fingers   Lymphadenopathy:     She has no cervical adenopathy.   Neurological: She is alert and oriented to person, place, and time. She displays normal reflexes. No cranial nerve deficit or sensory deficit. She exhibits normal muscle tone. Coordination normal.   Skin: No rash noted. No erythema.   Vitals reviewed.      Right Side Rheumatological Exam     Examination finds the elbow, wrist, 1st PIP, 1st MCP, 2nd PIP, 3rd PIP, 3rd MCP, 4th PIP, 4th MCP, 5th PIP and 5th MCP normal.    The patient is tender to " palpation of the 2nd MCP    She has swelling of the 1st MTP    Left Side Rheumatological Exam     Examination finds the elbow, wrist, 1st PIP, 1st MCP, 2nd PIP, 2nd MCP, 3rd PIP, 3rd MCP, 4th PIP, 4th MCP, 5th PIP and 5th MCP normal.         No data to display     Assessment:         Medication List with Changes/Refills   Current Medications    BISACODYL 5 MG TAB    Take 20 mg by mouth once daily.       Start Date: --        End Date: --    DOCUSATE SODIUM (COLACE) 100 MG CAPSULE    Take 1 capsule by mouth every evening.       Start Date: --        End Date: --    FLUOXETINE 20 MG CAPSULE    Take 20 mg by mouth once daily.       Start Date: --        End Date: --    IBUPROFEN (ADVIL,MOTRIN) 600 MG TABLET    Take 600 mg by mouth 2 (two) times daily as needed for Pain.       Start Date: --        End Date: --    PLECANATIDE (TRULANCE) 3 MG TAB    Take 3 mg by mouth nightly.       Start Date: --        End Date: --    PREDNISONE (DELTASONE) 5 MG TABLET    Take 1 tablet (5 mg total) by mouth daily as needed (for flare up).       Start Date: 1/23/2024 End Date: --    ROSUVASTATIN (CRESTOR) 40 MG TAB    Take 10 mg by mouth every evening. Taking 20 mg Bid       Start Date: --        End Date: --    SENNA (SENOKOT) 8.6 MG TABLET    Take 2 tablets by mouth every evening.       Start Date: --        End Date: --   Changed and/or Refilled Medications    Modified Medication Previous Medication    LEFLUNOMIDE (ARAVA) 20 MG TAB leflunomide (ARAVA) 20 MG Tab       Take 1 tablet (20 mg total) by mouth once daily.    TAKE 1 TABLET (20 MG TOTAL) BY MOUTH ONCE DAILY.       Start Date: 4/16/2025 End Date: --    Start Date: 10/21/2024End Date: 4/16/2025    TIZANIDINE (ZANAFLEX) 2 MG TABLET tiZANidine (ZANAFLEX) 2 MG tablet       Take 1 tablet (2 mg total) by mouth every evening.    TAKE 1 TABLET (2 MG TOTAL) BY MOUTH EVERY EVENING.       Start Date: 4/16/2025 End Date: --    Start Date: 12/9/2024 End Date: 4/16/2025   Discontinued  Medications    HYOSCYAMINE (ANASPAZ,LEVSIN) 0.125 MG TAB    Take 0.125 mg by mouth every 6 (six) hours as needed.       Start Date: 3/27/2025 End Date: 4/16/2025    LORATADINE (CLARITIN) 10 MG TABLET    Take 10 mg by mouth daily as needed.       Start Date: --        End Date: 4/16/2025    TRIAZOLAM 0.25 MG TABLET    Take 0.25 mg by mouth.       Start Date: 11/12/2024End Date: 4/16/2025         ICD-10-CM ICD-9-CM   1. Seropositive rheumatoid arthritis  M05.9 714.0   2. Drug-induced immunodeficiency  D84.821 279.3    Z79.899 E947.9   3. Fibromyalgia syndrome  M79.7 729.1   4. Psoriatic arthritis  L40.50 696.0         Plan:       1. Seropositive rheumatoid arthritis  Assessment & Plan:  RF+ CCP+. She was on MTX with continued flares. Leflunomide was started May 2023 with some improvement. 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 She does c/o of swelling of right foot- denies pain or discomfort but swelling noted. Reports swelling has been worse the last 2 weeks.     Continue Leflunomide 20 mg daily     Labs ordered today for continued monitoring   Baseline bilateral feet xray ordered  Hand Xray completed 2023         Orders:  -     Sedimentation rate; Future; Expected date: 04/16/2025  -     C-Reactive Protein; Future; Expected date: 04/16/2025  -     Comprehensive Metabolic Panel; Future; Expected date: 04/16/2025  -     CBC Auto Differential; Future; Expected date: 04/16/2025  -     X-Ray Foot Complete Right; Future; Expected date: 04/16/2025  -     X-Ray Foot Complete Left; Future; Expected date: 04/16/2025    2. Drug-induced immunodeficiency  Assessment & Plan:  Compromised immune system s/t autoimmune disease and use of immunosuppressive medications. (Leflunomide)  Hep serologies negative  - Monitor carefully for infections and toxicities.   - Advised  strict adherence to age appropriate vaccinations and cancer screenings- including yearly skin exams  - Advised to get immediate medical care if any infection.   - hold Leflunomide with infections    Orders:  -     CBC Auto Differential; Future; Expected date: 04/16/2025    3. Fibromyalgia syndrome  Assessment & Plan:  Stable  Continue Tizanidine 2 mg nightly     Fibromyalgia is a chronic pain syndrome.  Underlying causes of fibromyalgia may be numerous.  An underlying nonrestorative sleep pattern, mental and physical stressors play a role in pain perception.  Discussed importance of decreasing stress levels and improving sleep patterns/hygiene.   Exercise and a healthy life-style is important in management of this syndrome    Orders:  -     tiZANidine (ZANAFLEX) 2 MG tablet; Take 1 tablet (2 mg total) by mouth every evening.  Dispense: 90 tablet; Refill: 1    4. Psoriatic arthritis  Overview:  Previous patient of Dr. Stiles- she was being treated for RA and has only used HCQ in the past. During her initial visit with Dr. Caldera he noted that the patient has a history psoriasis, and now she has ridging of the nails and sausage fingers and toes and diagnosed her with psoriatic arthritis    Orders:  -     leflunomide (ARAVA) 20 MG Tab; Take 1 tablet (20 mg total) by mouth once daily.  Dispense: 90 tablet; Refill: 1          32 minutes of total time spent on the encounter, which includes face to face time and non-face to face time preparing to see the patient (eg, review of tests), Obtaining and/or reviewing separately obtained history, Documenting clinical information in the electronic or other health record, Independently interpreting results (not separately reported) and communicating results to the patient/family/caregiver, or Care coordination (not separately reported).

## 2025-04-16 NOTE — PROGRESS NOTES
Labs reviewed. All labs are within acceptable range. Kidney function and liver enzymes are normal. Inflammation levels are normal. No treatment changes are recommended at this time, you can continue the current prescribed medications.      DARNELL Cortes NP

## 2025-04-21 NOTE — PROGRESS NOTES
Labs reviewed. All labs are within acceptable range. No treatment changes are recommended at this time.    DARNELL Cortes, NP

## 2025-06-24 DIAGNOSIS — M79.7 FIBROMYALGIA SYNDROME: ICD-10-CM

## 2025-06-30 RX ORDER — TIZANIDINE 2 MG/1
2 TABLET ORAL NIGHTLY
Qty: 90 TABLET | Refills: 1 | Status: SHIPPED | OUTPATIENT
Start: 2025-06-30

## 2025-09-03 ENCOUNTER — OFFICE VISIT (OUTPATIENT)
Dept: RHEUMATOLOGY | Facility: CLINIC | Age: 60
End: 2025-09-03
Payer: COMMERCIAL

## 2025-09-03 VITALS
HEIGHT: 64 IN | WEIGHT: 140.81 LBS | RESPIRATION RATE: 18 BRPM | BODY MASS INDEX: 24.04 KG/M2 | DIASTOLIC BLOOD PRESSURE: 71 MMHG | HEART RATE: 79 BPM | SYSTOLIC BLOOD PRESSURE: 120 MMHG | TEMPERATURE: 99 F

## 2025-09-03 DIAGNOSIS — D84.821 DRUG-INDUCED IMMUNODEFICIENCY: ICD-10-CM

## 2025-09-03 DIAGNOSIS — Z79.899 DRUG-INDUCED IMMUNODEFICIENCY: ICD-10-CM

## 2025-09-03 DIAGNOSIS — M05.9 SEROPOSITIVE RHEUMATOID ARTHRITIS: Primary | ICD-10-CM

## 2025-09-03 DIAGNOSIS — M79.7 FIBROMYALGIA SYNDROME: ICD-10-CM

## 2025-09-03 DIAGNOSIS — L98.8 SKIN PLAQUE: ICD-10-CM

## 2025-09-03 PROCEDURE — 99215 OFFICE O/P EST HI 40 MIN: CPT | Mod: S$GLB,,,

## 2025-09-03 PROCEDURE — 99999 PR PBB SHADOW E&M-EST. PATIENT-LVL IV: CPT | Mod: PBBFAC,,,

## 2025-09-03 RX ORDER — ADALIMUMAB 40MG/0.4ML
40 KIT SUBCUTANEOUS
Qty: 2 PEN | Refills: 11 | Status: SHIPPED | OUTPATIENT
Start: 2025-09-03 | End: 2025-09-03 | Stop reason: SDUPTHER

## 2025-09-03 RX ORDER — ADALIMUMAB 40MG/0.4ML
40 KIT SUBCUTANEOUS
Qty: 6 PEN | Refills: 11 | Status: SHIPPED | OUTPATIENT
Start: 2025-09-03